# Patient Record
Sex: FEMALE | HISPANIC OR LATINO | URBAN - METROPOLITAN AREA
[De-identification: names, ages, dates, MRNs, and addresses within clinical notes are randomized per-mention and may not be internally consistent; named-entity substitution may affect disease eponyms.]

---

## 2017-01-05 ENCOUNTER — INPATIENT (INPATIENT)
Facility: HOSPITAL | Age: 37
LOS: 8 days | Discharge: HOME | DRG: 781 | End: 2017-01-14
Attending: OBSTETRICS & GYNECOLOGY | Admitting: OBSTETRICS & GYNECOLOGY
Payer: MEDICAID

## 2017-01-05 DIAGNOSIS — O47.02 FALSE LABOR BEFORE 37 COMPLETED WEEKS OF GESTATION, SECOND TRIMESTER: ICD-10-CM

## 2017-01-05 DIAGNOSIS — O34.219 MATERNAL CARE FOR UNSPECIFIED TYPE SCAR FROM PREVIOUS CESAREAN DELIVERY: ICD-10-CM

## 2017-01-05 DIAGNOSIS — O26.893 OTHER SPECIFIED PREGNANCY RELATED CONDITIONS, THIRD TRIMESTER: ICD-10-CM

## 2017-01-05 LAB
ALBUMIN SERPL ELPH-MCNC: 3.8 G/DL — SIGNIFICANT CHANGE UP (ref 3.3–5.2)
ALP SERPL-CCNC: 120 U/L — SIGNIFICANT CHANGE UP (ref 40–120)
ALT FLD-CCNC: 26 U/L — SIGNIFICANT CHANGE UP
AMPHET UR-MCNC: NEGATIVE — SIGNIFICANT CHANGE UP
ANION GAP SERPL CALC-SCNC: 21 MMOL/L — HIGH (ref 5–17)
APPEARANCE UR: CLEAR — SIGNIFICANT CHANGE UP
APTT BLD: 27.6 SEC — SIGNIFICANT CHANGE UP (ref 27.5–37.4)
AST SERPL-CCNC: 23 U/L — SIGNIFICANT CHANGE UP
BARBITURATES UR SCN-MCNC: NEGATIVE — SIGNIFICANT CHANGE UP
BASOPHILS # BLD AUTO: 0 K/UL — SIGNIFICANT CHANGE UP (ref 0–0.2)
BASOPHILS NFR BLD AUTO: 0.1 % — SIGNIFICANT CHANGE UP (ref 0–2)
BENZODIAZ UR-MCNC: NEGATIVE — SIGNIFICANT CHANGE UP
BILIRUB SERPL-MCNC: 0.4 MG/DL — SIGNIFICANT CHANGE UP (ref 0.4–2)
BILIRUB UR-MCNC: NEGATIVE — SIGNIFICANT CHANGE UP
BLD GP AB SCN SERPL QL: SIGNIFICANT CHANGE UP
BUN SERPL-MCNC: 10 MG/DL — SIGNIFICANT CHANGE UP (ref 8–20)
CALCIUM SERPL-MCNC: 9 MG/DL — SIGNIFICANT CHANGE UP (ref 8.6–10.2)
CHLORIDE SERPL-SCNC: 99 MMOL/L — SIGNIFICANT CHANGE UP (ref 98–107)
CO2 SERPL-SCNC: 18 MMOL/L — LOW (ref 22–29)
COCAINE METAB.OTHER UR-MCNC: NEGATIVE — SIGNIFICANT CHANGE UP
COLOR SPEC: YELLOW — SIGNIFICANT CHANGE UP
CREAT SERPL-MCNC: 0.48 MG/DL — LOW (ref 0.5–1.3)
DIFF PNL FLD: NEGATIVE — SIGNIFICANT CHANGE UP
EOSINOPHIL # BLD AUTO: 0 K/UL — SIGNIFICANT CHANGE UP (ref 0–0.5)
EOSINOPHIL NFR BLD AUTO: 0.2 % — SIGNIFICANT CHANGE UP (ref 0–6)
EPI CELLS # UR: SIGNIFICANT CHANGE UP
GLUCOSE SERPL-MCNC: 92 MG/DL — SIGNIFICANT CHANGE UP (ref 70–115)
GLUCOSE UR QL: NEGATIVE MG/DL — SIGNIFICANT CHANGE UP
HCOV OC43 RNA SPEC QL NAA+PROBE: DETECTED
HCT VFR BLD CALC: 35.6 % — LOW (ref 37–47)
HGB BLD-MCNC: 12 G/DL — SIGNIFICANT CHANGE UP (ref 12–16)
HIV 1 & 2 AB SERPL IA.RAPID: SIGNIFICANT CHANGE UP
INR BLD: 0.98 RATIO — SIGNIFICANT CHANGE UP (ref 0.88–1.16)
KETONES UR-MCNC: ABNORMAL
LEUKOCYTE ESTERASE UR-ACNC: ABNORMAL
LYMPHOCYTES # BLD AUTO: 1.4 K/UL — SIGNIFICANT CHANGE UP (ref 1–4.8)
LYMPHOCYTES # BLD AUTO: 11.8 % — LOW (ref 20–55)
MCHC RBC-ENTMCNC: 29.6 PG — SIGNIFICANT CHANGE UP (ref 27–31)
MCHC RBC-ENTMCNC: 33.7 G/DL — SIGNIFICANT CHANGE UP (ref 32–36)
MCV RBC AUTO: 87.9 FL — SIGNIFICANT CHANGE UP (ref 81–99)
METHADONE UR-MCNC: NEGATIVE — SIGNIFICANT CHANGE UP
MONOCYTES # BLD AUTO: 0.6 K/UL — SIGNIFICANT CHANGE UP (ref 0–0.8)
MONOCYTES NFR BLD AUTO: 5.5 % — SIGNIFICANT CHANGE UP (ref 3–10)
NEUTROPHILS # BLD AUTO: 9.8 K/UL — HIGH (ref 1.8–8)
NEUTROPHILS NFR BLD AUTO: 82.1 % — HIGH (ref 37–73)
NITRITE UR-MCNC: NEGATIVE — SIGNIFICANT CHANGE UP
OPIATES UR-MCNC: NEGATIVE — SIGNIFICANT CHANGE UP
PCP SPEC-MCNC: SIGNIFICANT CHANGE UP
PCP UR-MCNC: NEGATIVE — SIGNIFICANT CHANGE UP
PH UR: 6.5 — SIGNIFICANT CHANGE UP (ref 4.8–8)
PLATELET # BLD AUTO: 377 K/UL — SIGNIFICANT CHANGE UP (ref 150–400)
POTASSIUM SERPL-MCNC: 3.4 MMOL/L — LOW (ref 3.5–5.3)
POTASSIUM SERPL-SCNC: 3.4 MMOL/L — LOW (ref 3.5–5.3)
PROT SERPL-MCNC: 7.2 G/DL — SIGNIFICANT CHANGE UP (ref 6.6–8.7)
PROT UR-MCNC: NEGATIVE MG/DL — SIGNIFICANT CHANGE UP
PROTHROM AB SERPL-ACNC: 10.8 SEC — SIGNIFICANT CHANGE UP (ref 10–13.1)
RAPID RVP RESULT: DETECTED
RBC # BLD: 4.05 M/UL — LOW (ref 4.4–5.2)
RBC # FLD: 13.7 % — SIGNIFICANT CHANGE UP (ref 11–15.6)
SODIUM SERPL-SCNC: 138 MMOL/L — SIGNIFICANT CHANGE UP (ref 135–145)
SP GR SPEC: 1.01 — SIGNIFICANT CHANGE UP (ref 1.01–1.02)
THC UR QL: NEGATIVE — SIGNIFICANT CHANGE UP
TYPE + AB SCN PNL BLD: SIGNIFICANT CHANGE UP
UROBILINOGEN FLD QL: NEGATIVE MG/DL — SIGNIFICANT CHANGE UP
WBC # BLD: 11.87 K/UL — HIGH (ref 4.8–10.8)
WBC # FLD AUTO: 11.87 K/UL — HIGH (ref 4.8–10.8)
WBC UR QL: SIGNIFICANT CHANGE UP

## 2017-01-05 RX ORDER — SODIUM CHLORIDE 9 MG/ML
1000 INJECTION, SOLUTION INTRAVENOUS
Qty: 0 | Refills: 0 | Status: DISCONTINUED | OUTPATIENT
Start: 2017-01-05 | End: 2017-01-06

## 2017-01-05 RX ORDER — SODIUM CHLORIDE 9 MG/ML
1000 INJECTION, SOLUTION INTRAVENOUS ONCE
Qty: 0 | Refills: 0 | Status: COMPLETED | OUTPATIENT
Start: 2017-01-05 | End: 2017-01-05

## 2017-01-05 RX ADMIN — SODIUM CHLORIDE 1000 MILLILITER(S): 9 INJECTION, SOLUTION INTRAVENOUS at 20:05

## 2017-01-05 RX ADMIN — Medication 204 MILLIGRAM(S): at 20:59

## 2017-01-05 RX ADMIN — SODIUM CHLORIDE 125 MILLILITER(S): 9 INJECTION, SOLUTION INTRAVENOUS at 21:00

## 2017-01-06 VITALS — WEIGHT: 158.73 LBS | HEIGHT: 62 IN

## 2017-01-06 DIAGNOSIS — O09.522 SUPERVISION OF ELDERLY MULTIGRAVIDA, SECOND TRIMESTER: ICD-10-CM

## 2017-01-06 DIAGNOSIS — O99.89 OTHER SPECIFIED DISEASES AND CONDITIONS COMPLICATING PREGNANCY, CHILDBIRTH AND THE PUERPERIUM: ICD-10-CM

## 2017-01-06 DIAGNOSIS — Z3A.24 24 WEEKS GESTATION OF PREGNANCY: ICD-10-CM

## 2017-01-06 DIAGNOSIS — O30.049 TWIN PREGNANCY, DICHORIONIC/DIAMNIOTIC, UNSPECIFIED TRIMESTER: ICD-10-CM

## 2017-01-06 DIAGNOSIS — O21.1 HYPEREMESIS GRAVIDARUM WITH METABOLIC DISTURBANCE: ICD-10-CM

## 2017-01-06 DIAGNOSIS — B34.9 VIRAL INFECTION, UNSPECIFIED: ICD-10-CM

## 2017-01-06 LAB
ALBUMIN SERPL ELPH-MCNC: 3 G/DL — LOW (ref 3.3–5.2)
ALP SERPL-CCNC: 99 U/L — SIGNIFICANT CHANGE UP (ref 40–120)
ALT FLD-CCNC: 26 U/L — SIGNIFICANT CHANGE UP
ANION GAP SERPL CALC-SCNC: 17 MMOL/L — SIGNIFICANT CHANGE UP (ref 5–17)
APPEARANCE UR: CLEAR — SIGNIFICANT CHANGE UP
AST SERPL-CCNC: 23 U/L — SIGNIFICANT CHANGE UP
BACTERIA # UR AUTO: ABNORMAL
BASOPHILS # BLD AUTO: 0 K/UL — SIGNIFICANT CHANGE UP (ref 0–0.2)
BASOPHILS NFR BLD AUTO: 0.1 % — SIGNIFICANT CHANGE UP (ref 0–2)
BILIRUB SERPL-MCNC: 0.4 MG/DL — SIGNIFICANT CHANGE UP (ref 0.4–2)
BILIRUB UR-MCNC: NEGATIVE — SIGNIFICANT CHANGE UP
BUN SERPL-MCNC: 6 MG/DL — LOW (ref 8–20)
CALCIUM SERPL-MCNC: 8.9 MG/DL — SIGNIFICANT CHANGE UP (ref 8.6–10.2)
CHLORIDE SERPL-SCNC: 102 MMOL/L — SIGNIFICANT CHANGE UP (ref 98–107)
CO2 SERPL-SCNC: 16 MMOL/L — LOW (ref 22–29)
COLOR SPEC: YELLOW — SIGNIFICANT CHANGE UP
CREAT SERPL-MCNC: 0.4 MG/DL — LOW (ref 0.5–1.3)
DIFF PNL FLD: NEGATIVE — SIGNIFICANT CHANGE UP
EPI CELLS # UR: SIGNIFICANT CHANGE UP
GLUCOSE SERPL-MCNC: 101 MG/DL — SIGNIFICANT CHANGE UP (ref 70–115)
GLUCOSE UR QL: NEGATIVE MG/DL — SIGNIFICANT CHANGE UP
HBV SURFACE AG SERPL QL IA: SIGNIFICANT CHANGE UP
HCT VFR BLD CALC: 30.2 % — LOW (ref 37–47)
HGB BLD-MCNC: 10.4 G/DL — LOW (ref 12–16)
KETONES UR-MCNC: ABNORMAL
LACTATE BLDV-MCNC: 0.8 MMOL/L — SIGNIFICANT CHANGE UP (ref 0.7–2)
LEUKOCYTE ESTERASE UR-ACNC: ABNORMAL
LIDOCAIN IGE QN: 21 U/L — LOW (ref 22–51)
LYMPHOCYTES # BLD AUTO: 1.3 K/UL — SIGNIFICANT CHANGE UP (ref 1–4.8)
LYMPHOCYTES # BLD AUTO: 13.7 % — LOW (ref 20–55)
MCHC RBC-ENTMCNC: 29.5 PG — SIGNIFICANT CHANGE UP (ref 27–31)
MCHC RBC-ENTMCNC: 34.4 G/DL — SIGNIFICANT CHANGE UP (ref 32–36)
MCV RBC AUTO: 85.8 FL — SIGNIFICANT CHANGE UP (ref 81–99)
MONOCYTES # BLD AUTO: 0.7 K/UL — SIGNIFICANT CHANGE UP (ref 0–0.8)
MONOCYTES NFR BLD AUTO: 7.4 % — SIGNIFICANT CHANGE UP (ref 3–10)
NEUTROPHILS # BLD AUTO: 7.6 K/UL — SIGNIFICANT CHANGE UP (ref 1.8–8)
NEUTROPHILS NFR BLD AUTO: 78.3 % — HIGH (ref 37–73)
NITRITE UR-MCNC: NEGATIVE — SIGNIFICANT CHANGE UP
PH UR: 6 — SIGNIFICANT CHANGE UP (ref 4.8–8)
PLATELET # BLD AUTO: 350 K/UL — SIGNIFICANT CHANGE UP (ref 150–400)
POTASSIUM SERPL-MCNC: 3.8 MMOL/L — SIGNIFICANT CHANGE UP (ref 3.5–5.3)
POTASSIUM SERPL-SCNC: 3.8 MMOL/L — SIGNIFICANT CHANGE UP (ref 3.5–5.3)
PROCALCITONIN SERPL-MCNC: <0.23 NG/ML — SIGNIFICANT CHANGE UP (ref 0–0.5)
PROT SERPL-MCNC: 6.1 G/DL — LOW (ref 6.6–8.7)
PROT UR-MCNC: 15 MG/DL
RBC # BLD: 3.52 M/UL — LOW (ref 4.4–5.2)
RBC # FLD: 13.5 % — SIGNIFICANT CHANGE UP (ref 11–15.6)
RBC CASTS # UR COMP ASSIST: SIGNIFICANT CHANGE UP /HPF (ref 0–4)
RPR SERPL-ACNC: SIGNIFICANT CHANGE UP
RUBV IGG SER-ACNC: POSITIVE — SIGNIFICANT CHANGE UP
RUBV IGG SER-IMP: SIGNIFICANT CHANGE UP
SODIUM SERPL-SCNC: 135 MMOL/L — SIGNIFICANT CHANGE UP (ref 135–145)
SP GR SPEC: 1.02 — SIGNIFICANT CHANGE UP (ref 1.01–1.02)
T3 SERPL-MCNC: 108 NG/DL — SIGNIFICANT CHANGE UP (ref 80–200)
T4 AB SER-ACNC: 8.78 UG/DL — SIGNIFICANT CHANGE UP (ref 4.5–12)
TSH SERPL-MCNC: 2.46 UU/ML — SIGNIFICANT CHANGE UP (ref 0.27–4.2)
UROBILINOGEN FLD QL: 8 MG/DL
WBC # BLD: 9.75 K/UL — SIGNIFICANT CHANGE UP (ref 4.8–10.8)
WBC # FLD AUTO: 9.75 K/UL — SIGNIFICANT CHANGE UP (ref 4.8–10.8)
WBC UR QL: SIGNIFICANT CHANGE UP

## 2017-01-06 PROCEDURE — 76815 OB US LIMITED FETUS(S): CPT | Mod: 26

## 2017-01-06 PROCEDURE — 99253 IP/OBS CNSLTJ NEW/EST LOW 45: CPT | Mod: GC

## 2017-01-06 PROCEDURE — 76775 US EXAM ABDO BACK WALL LIM: CPT | Mod: 26

## 2017-01-06 PROCEDURE — 71010: CPT | Mod: 26

## 2017-01-06 RX ORDER — ONDANSETRON 8 MG/1
4 TABLET, FILM COATED ORAL EVERY 4 HOURS
Qty: 0 | Refills: 0 | Status: COMPLETED | OUTPATIENT
Start: 2017-01-06 | End: 2017-01-07

## 2017-01-06 RX ORDER — SODIUM CHLORIDE 9 MG/ML
1000 INJECTION, SOLUTION INTRAVENOUS ONCE
Qty: 0 | Refills: 0 | Status: COMPLETED | OUTPATIENT
Start: 2017-01-06 | End: 2017-01-06

## 2017-01-06 RX ORDER — PYRIDOXINE HCL (VITAMIN B6) 100 MG
25 TABLET ORAL EVERY 8 HOURS
Qty: 0 | Refills: 0 | Status: COMPLETED | OUTPATIENT
Start: 2017-01-06 | End: 2017-01-07

## 2017-01-06 RX ORDER — CEFAZOLIN SODIUM 1 G
2000 VIAL (EA) INJECTION ONCE
Qty: 0 | Refills: 0 | Status: COMPLETED | OUTPATIENT
Start: 2017-01-06 | End: 2017-01-06

## 2017-01-06 RX ORDER — THIAMINE MONONITRATE (VIT B1) 100 MG
100 TABLET ORAL DAILY
Qty: 0 | Refills: 0 | Status: DISCONTINUED | OUTPATIENT
Start: 2017-01-06 | End: 2017-01-06

## 2017-01-06 RX ORDER — SODIUM CHLORIDE 9 MG/ML
1000 INJECTION, SOLUTION INTRAVENOUS
Qty: 0 | Refills: 0 | Status: DISCONTINUED | OUTPATIENT
Start: 2017-01-06 | End: 2017-01-08

## 2017-01-06 RX ORDER — SODIUM CHLORIDE 9 MG/ML
1000 INJECTION, SOLUTION INTRAVENOUS
Qty: 0 | Refills: 0 | Status: DISCONTINUED | OUTPATIENT
Start: 2017-01-06 | End: 2017-01-06

## 2017-01-06 RX ORDER — SODIUM CHLORIDE 9 MG/ML
1000 INJECTION, SOLUTION INTRAVENOUS
Qty: 0 | Refills: 0 | Status: COMPLETED | OUTPATIENT
Start: 2017-01-08 | End: 2017-01-08

## 2017-01-06 RX ORDER — SODIUM CHLORIDE 9 MG/ML
1000 INJECTION, SOLUTION INTRAVENOUS
Qty: 0 | Refills: 0 | Status: COMPLETED | OUTPATIENT
Start: 2017-01-07 | End: 2017-01-07

## 2017-01-06 RX ORDER — METOCLOPRAMIDE HCL 10 MG
10 TABLET ORAL EVERY 8 HOURS
Qty: 0 | Refills: 0 | Status: DISCONTINUED | OUTPATIENT
Start: 2017-01-06 | End: 2017-01-08

## 2017-01-06 RX ORDER — SODIUM CHLORIDE 9 MG/ML
1000 INJECTION, SOLUTION INTRAVENOUS
Qty: 0 | Refills: 0 | Status: COMPLETED | OUTPATIENT
Start: 2017-01-06 | End: 2017-01-06

## 2017-01-06 RX ORDER — POTASSIUM CHLORIDE 20 MEQ
20 PACKET (EA) ORAL
Qty: 0 | Refills: 0 | Status: DISCONTINUED | OUTPATIENT
Start: 2017-01-06 | End: 2017-01-06

## 2017-01-06 RX ADMIN — ONDANSETRON 4 MILLIGRAM(S): 8 TABLET, FILM COATED ORAL at 18:21

## 2017-01-06 RX ADMIN — ONDANSETRON 4 MILLIGRAM(S): 8 TABLET, FILM COATED ORAL at 22:06

## 2017-01-06 RX ADMIN — SODIUM CHLORIDE 150 MILLILITER(S): 9 INJECTION, SOLUTION INTRAVENOUS at 20:07

## 2017-01-06 RX ADMIN — Medication 10 MILLIGRAM(S): at 18:45

## 2017-01-06 RX ADMIN — SODIUM CHLORIDE 125 MILLILITER(S): 9 INJECTION, SOLUTION INTRAVENOUS at 12:47

## 2017-01-06 RX ADMIN — ONDANSETRON 4 MILLIGRAM(S): 8 TABLET, FILM COATED ORAL at 14:12

## 2017-01-06 RX ADMIN — Medication 204 MILLIGRAM(S): at 06:50

## 2017-01-06 RX ADMIN — Medication 100 MILLIGRAM(S): at 01:30

## 2017-01-06 RX ADMIN — SODIUM CHLORIDE 1000 MILLILITER(S): 9 INJECTION, SOLUTION INTRAVENOUS at 08:53

## 2017-01-06 RX ADMIN — ONDANSETRON 4 MILLIGRAM(S): 8 TABLET, FILM COATED ORAL at 09:45

## 2017-01-06 RX ADMIN — Medication 25 MILLIGRAM(S): at 22:05

## 2017-01-06 RX ADMIN — SODIUM CHLORIDE 125 MILLILITER(S): 9 INJECTION, SOLUTION INTRAVENOUS at 10:25

## 2017-01-06 NOTE — CONSULT NOTE ADULT - ATTENDING COMMENTS
Patient examined with resident. Recommendations discussed with resident, OB provider and L & D RN staff.

## 2017-01-06 NOTE — CONSULT NOTE ADULT - PROBLEM SELECTOR RECOMMENDATION 4
Infectious disease consultation for Zika testing authorization Infectious disease consultation for Zika testing authorization.

## 2017-01-06 NOTE — CONSULT NOTE ADULT - ASSESSMENT
Patient is a 36 year old  Female, LMP 16, KESHAWN 17, EGA today is 24 weeks and 2 days, who presents with a chief complaint of nausea and vomiting for the past 3 days.  She has a twin gestation, and advanced maternal age.  Clinical presentation consistent with hyperemesis gravidarum, due to her ketonuria and hypokalemia.  She is suspected to have Zika virus exposure since she came from [place] where there is active Zika virus transmission and report having mosquito bites during pregnancy.  She tested positive for Coronovirus. Patient is a 36 year old  Female, LMP 16, KESHAWN 17, EGA today is 24 weeks and 2 days, who presents with a chief complaint of nausea and vomiting for the past 3 days.  She has a twin gestation, and advanced maternal age.  Clinical presentation consistent with hyperemesis gravidarum, due to her ketonuria and hypokalemia.  She is suspected to have Zika virus exposure since she came from [place] where there is active Zika virus transmission and she reported having mosquito bites during pregnancy.  She tested positive for Coronovirus.

## 2017-01-06 NOTE — CONSULT NOTE ADULT - PROBLEM SELECTOR RECOMMENDATION 9
Keep NPO.  Add Reglan 10mg IV q8h and continue with current meds otherwise.  Continue potassium supplementation until normal potassium level; will repeat Potassium level as needed.  Pyridoxine 25 mg q8h when tolerating oral feedings.  Repeat UA to determine whether ketonuria (starvation ketosis) has been corrected with IV D5 LR. Keep NPO.  Add Reglan 10mg IV q8h and continue with current antiemetic medications.  Continue potassium supplementation until normal potassium level; will repeat Potassium level as needed.  Pyridoxine 25 mg q8h when tolerating oral feedings.  Repeat UA to determine whether ketonuria (starvation ketosis) has been corrected with IV D5 LR.

## 2017-01-06 NOTE — CONSULT NOTE ADULT - SUBJECTIVE AND OBJECTIVE BOX
Patient is a 36 year old  Female, LMP 16, KESHAWN 17, EGA today is 24 weeks and 2 days, who presents with a chief complaint of nausea and vomiting for the past 3 days.  She states that she has been having nausea since [place].  [place].  She was hospitalized in [place] for approximately 15 days for severe nausea and vomiting.  She is known to have a twin pregnancy. On arrival to [facility], she was noted to have left costovertebral angle tenderness.   CBC revealed a WBC 11k.  UA revealed trace Leukocyte Esterase; negative nitrites; no WBCs.  Pt was given 1 dose of IV Ancef 2g for presumptive cystitis.  No urine culture sen.  Upon arrival at 11pm, she was given IV fluids, Bicitra x1, and IV Phenergan x1 for nausea and vomiting.  She was started on Zofran 4mg q4h early this morning.  Admission UA revealed large ketones and currently she is receiving maintenance D5 + LR IV fluids.  In addition she is receiving a daily multivitamin IV supplementation which includes thiamine. She is NPO.    OB Hx:  2 prior Full term spontaneous vaginal deliveries in her native country of [place].    Vital Signs Last 24 Hrs  T(C): --36.9  HR: --100  BP: --122/72  BP(mean): --91  RR: --16  SpO2: --100      PHYSICAL EXAM:    GENERAL: NAD,   EYES: EOMI, PERRLA, conjunctiva and sclera clear  ENMT: No tonsillar erythema, exudates, or enlargement; Moist mucous membranes, Good dentition, No lesions  NECK: Supple, No JVD, Normal thyroid  NERVOUS SYSTEM:  Alert & Oriented X3, Good concentration; Motor Strength 5/5 B/L upper and lower extremities; DTRs 2+ intact and symmetric  CHEST/LUNG: Clear to percussion bilaterally; No rales, rhonchi, wheezing, or rubs  HEART: Regular rate and rhythm; No murmurs, rubs, or gallops  ABDOMEN: Gravid, appropriate for gestational age, Nontender; Bowel sounds present  EXTREMITIES:  2+ Peripheral Pulses, No clubbing, cyanosis, or edema  LYMPH: No lymphadenopathy noted        LABS:                        12.0   11.87 )-----------( 377      ( 2017 21:50 )             35.6     CBC Full  -  ( 2017 21:50 )  WBC Count : 11.87 K/uL  Hemoglobin : 12.0 g/dL  Hematocrit : 35.6 %  Platelet Count - Automated : 377 K/uL  Mean Cell Volume : 87.9 fl  Mean Cell Hemoglobin : 29.6 pg  Mean Cell Hemoglobin Concentration : 33.7 g/dL  Auto Neutrophil # : 9.8 K/uL  Auto Lymphocyte # : 1.4 K/uL  Auto Monocyte # : 0.6 K/uL  Auto Eosinophil # : 0.0 K/uL  Auto Basophil # : 0.0 K/uL  Auto Neutrophil % : 82.1 %  Auto Lymphocyte % : 11.8 %  Auto Monocyte % : 5.5 %  Auto Eosinophil % : 0.2 %  Auto Basophil % : 0.1 %    2017 21:47    138    |  99     |  10.0   ----------------------------<  92     3.4     |  18.0   |  0.48     Ca    9.0        2017 21:47    TPro  7.2    /  Alb  3.8    /  TBili  0.4    /  DBili  x      /  AST  23     /  ALT  26     /  AlkPhos  120    2017 21:47    PT/INR - ( 2017 21:47 )   PT: 10.8 sec;   INR: 0.98 ratio     PTT - ( 2017 21:47 )  PTT:27.6 sec    Urinalysis Basic - ( 2017 21:44 )  Color: Yellow / Appearance: Clear / S.010 / pH: x  Gluc: x / Ketone: Large  / Bili: Negative / Urobili: Negative mg/dL   Blood: x / Protein: Negative mg/dL / Nitrite: Negative   Leuk Esterase: Trace / RBC: x / WBC 0-2   Sq Epi: x / Non Sq Epi: Occasional / Bacteria: x    Albumin, Serum: 3.8 g/dL ( @ 21:47)    LIVER FUNCTIONS - ( 2017 21:47 )  Alb: 3.8 g/dL / Pro: 7.2 g/dL / ALK PHOS: 120 U/L / ALT: 26 U/L / AST: 23 U/L            RADIOLOGY & ADDITIONAL TESTS:  US OB: The cervix measures 3.4 cm in length and appears closed.   Dichorionic, diamniotic twin intrauterine gestation is noted. Evaluation   of fetal anatomy is limited on this examination.     TWIN A (maternal left): Anterior placenta. Vertex presentation of the   fetus. The fetal heart rate of 153 beats per minute.   The measurements are as follows:   NOLBERTO: 17.3 cm (normal range of 9-23.8).   BPD: 5.9 cm (23 weeks 6 days).  HC:  21.8 cm (23 weeks 6 days).  AC: 19.0 cm (23 weeks 5 days).   FL: 4.1 cm (23 weeks 2 days).  Estimated fetal weight: 1 lb 5 oz (+/-3 oz) or 606.6 g (+/-91.0 g).  Estimated gestational age by US: 23 weeks 6 days (+/-1 weeks 5 days).  Expected date of delivery by US: 2017.     TWIN B (maternal right): Posterior placenta. Breech presentation of the   fetus. The fetal heart rate of 151 beats per minute.    The measurements are as follows:   NOLBERTO: 19.9 cm (normal range of 9-23.8).   BPD: 5.9 cm (24 weeks 2 days).  HC:  21.9 cm (24 weeks 0 day).  AC: 18.4 cm (23 weeks 2 days).   FL: 4.2 cm (23 weeks 4 days).  Estimated fetal weight: 1 lb 5 oz (+/-3 oz) or 596.2 g (+/-89.4 g).  Estimated gestational age by US: 23 weeks 6 days (+/-1 weeks 5 days).  Expected date of delivery by US: 17.     Impression:  Twin intrauterine gestation as described. Continued follow-up is   recommended.    US Renal: Right kidney: Measures 11.3 cm in length. No hydronephrosis or obvious   renal stone.   Left kidney: Measures 11.0 cm in length. No hydronephrosis or obvious   renal stone.   Bladder: Under distended, limiting evaluation Patient is a 36 year old  Female, LMP 16, KESHAWN 17, EGA today is 24 weeks and 2 days, who presents with a chief complaint of nausea and vomiting for the past 3 days.  She states that she has been having nausea since [place].  [place].  She was hospitalized in [place] for approximately 15 days for severe nausea and vomiting.  She is known to have a twin pregnancy. On arrival to [facility], she was noted to have left costovertebral angle tenderness.   CBC revealed a WBC 11k.  UA revealed trace Leukocyte Esterase; negative nitrites; no WBCs.  Pt was given 1 dose of IV Ancef 2g for presumptive cystitis.  No urine culture sen.  Upon arrival at 11pm, she was given IV fluids, Bicitra x1, and IV Phenergan x1 for nausea and vomiting.  She was started on Zofran 4mg q4h early this morning.  Admission UA revealed large ketones and currently she is receiving maintenance D5 + LR IV fluids.  In addition she is receiving a daily multivitamin IV supplementation which includes thiamine. She is NPO.  Patient noted to have a low potassium level.  She is currently receiving potassium supplementation.  She was noted to have a positive Rapid respiratory viral panel swab for Coronovirus.    OB Hx:  2 prior Full term spontaneous vaginal deliveries in her native country of [place].    Vital Signs Last 24 Hrs  T(C): --36.9  HR: --100  BP: --122/72  BP(mean): --91  RR: --16  SpO2: --100      PHYSICAL EXAM:    GENERAL: NAD,   EYES: EOMI, PERRLA, conjunctiva and sclera clear  ENMT: No tonsillar erythema, exudates, or enlargement; Moist mucous membranes, Good dentition, No lesions  NECK: Supple, No JVD, Normal thyroid  NERVOUS SYSTEM:  Alert & Oriented X3, Good concentration; Motor Strength 5/5 B/L upper and lower extremities; DTRs 2+ intact and symmetric  CHEST/LUNG: Clear to percussion bilaterally; No rales, rhonchi, wheezing, or rubs  HEART: Regular rate and rhythm; No murmurs, rubs, or gallops  ABDOMEN: Gravid, appropriate for gestational age, Nontender; Bowel sounds present  EXTREMITIES:  2+ Peripheral Pulses, No clubbing, cyanosis, or edema  LYMPH: No lymphadenopathy noted        LABS:                        12.0   11.87 )-----------( 377      ( 2017 21:50 )             35.6     CBC Full  -  ( 2017 21:50 )  WBC Count : 11.87 K/uL  Hemoglobin : 12.0 g/dL  Hematocrit : 35.6 %  Platelet Count - Automated : 377 K/uL  Mean Cell Volume : 87.9 fl  Mean Cell Hemoglobin : 29.6 pg  Mean Cell Hemoglobin Concentration : 33.7 g/dL  Auto Neutrophil # : 9.8 K/uL  Auto Lymphocyte # : 1.4 K/uL  Auto Monocyte # : 0.6 K/uL  Auto Eosinophil # : 0.0 K/uL  Auto Basophil # : 0.0 K/uL  Auto Neutrophil % : 82.1 %  Auto Lymphocyte % : 11.8 %  Auto Monocyte % : 5.5 %  Auto Eosinophil % : 0.2 %  Auto Basophil % : 0.1 %    2017 21:47    138    |  99     |  10.0   ----------------------------<  92     3.4     |  18.0   |  0.48     Ca    9.0        2017 21:47    TPro  7.2    /  Alb  3.8    /  TBili  0.4    /  DBili  x      /  AST  23     /  ALT  26     /  AlkPhos  120    2017 21:47    PT/INR - ( 2017 21:47 )   PT: 10.8 sec;   INR: 0.98 ratio     PTT - ( 2017 21:47 )  PTT:27.6 sec    Urinalysis Basic - ( 2017 21:44 )  Color: Yellow / Appearance: Clear / S.010 / pH: x  Gluc: x / Ketone: Large  / Bili: Negative / Urobili: Negative mg/dL   Blood: x / Protein: Negative mg/dL / Nitrite: Negative   Leuk Esterase: Trace / RBC: x / WBC 0-2   Sq Epi: x / Non Sq Epi: Occasional / Bacteria: x    Albumin, Serum: 3.8 g/dL ( @ 21:47)    LIVER FUNCTIONS - ( 2017 21:47 )  Alb: 3.8 g/dL / Pro: 7.2 g/dL / ALK PHOS: 120 U/L / ALT: 26 U/L / AST: 23 U/L            RADIOLOGY & ADDITIONAL TESTS:  US OB: The cervix measures 3.4 cm in length and appears closed.   Dichorionic, diamniotic twin intrauterine gestation is noted. Evaluation   of fetal anatomy is limited on this examination.     TWIN A (maternal left): Anterior placenta. Vertex presentation of the   fetus. The fetal heart rate of 153 beats per minute.   The measurements are as follows:   NOLBERTO: 17.3 cm (normal range of 9-23.8).   BPD: 5.9 cm (23 weeks 6 days).  HC:  21.8 cm (23 weeks 6 days).  AC: 19.0 cm (23 weeks 5 days).   FL: 4.1 cm (23 weeks 2 days).  Estimated fetal weight: 1 lb 5 oz (+/-3 oz) or 606.6 g (+/-91.0 g).  Estimated gestational age by US: 23 weeks 6 days (+/-1 weeks 5 days).  Expected date of delivery by US: 2017.     TWIN B (maternal right): Posterior placenta. Breech presentation of the   fetus. The fetal heart rate of 151 beats per minute.    The measurements are as follows:   NOLBERTO: 19.9 cm (normal range of 9-23.8).   BPD: 5.9 cm (24 weeks 2 days).  HC:  21.9 cm (24 weeks 0 day).  AC: 18.4 cm (23 weeks 2 days).   FL: 4.2 cm (23 weeks 4 days).  Estimated fetal weight: 1 lb 5 oz (+/-3 oz) or 596.2 g (+/-89.4 g).  Estimated gestational age by US: 23 weeks 6 days (+/-1 weeks 5 days).  Expected date of delivery by US: 17.     Impression:  Twin intrauterine gestation as described. Continued follow-up is   recommended.    US Renal: Right kidney: Measures 11.3 cm in length. No hydronephrosis or obvious   renal stone.   Left kidney: Measures 11.0 cm in length. No hydronephrosis or obvious   renal stone.   Bladder: Under distended, limiting evaluation Patient is a 36 year old  Female, LMP 16, KESHAWN 17, EGA today is 24 weeks and 2 days, who presents with a chief complaint of nausea and vomiting for the past 3 days.  She states that she has been having nausea since her [place].  [place] on [place].  She was hospitalized in [place] for approximately 15 days for severe nausea and vomiting.  She is known to have a twin pregnancy. On arrival to [facility], she was noted to have left costovertebral angle tenderness.   CBC revealed a WBC 11k.  UA revealed trace Leukocyte Esterase; negative nitrites; no WBCs.  Pt was given 1 dose of IV Ancef 2g for presumptive cystitis.  No urine culture sen.  She was given IV fluids, Bicitra x1, and IV Phenergan x1 for nausea and vomiting.  She was started on Zofran 4mg q4h early this morning.  Admission UA revealed large ketones and currently she is receiving maintenance D5 RL IV fluids.  In addition, she is receiving a daily multivitamin IV supplementation which includes thiamine. She is NPO.  Patient noted to have a low potassium level.  She is currently receiving potassium supplementation.  She was noted to have a positive Rapid respiratory viral panel swab for Coronovirus.    OB Hx:  2 prior Full term spontaneous vaginal deliveries in her native country of [place].    Vital Signs Last 24 Hrs  T(C): --36.9  HR: --100  BP: --122/72  BP(mean): --91  RR: --16  SpO2: --100    PHYSICAL EXAM:  GENERAL: NAD,   EYES: EOMI, PERRLA, conjunctiva and sclera clear  ENMT: No tonsillar erythema, exudates, or enlargement; Moist mucous membranes, Good dentition, No lesions  NECK: Supple, No JVD, Normal thyroid  NERVOUS SYSTEM:  Alert & Oriented X3, Good concentration; Motor Strength 5/5 B/L upper and lower extremities; DTRs 2+ intact and symmetric  CHEST/LUNG: Clear to percussion bilaterally; No rales, rhonchi, wheezing, or rubs  HEART: Regular rate and rhythm; No murmurs, rubs, or gallops  ABDOMEN: Gravid, appropriate for gestational age, Nontender; Bowel sounds present  EXTREMITIES:  2+ Peripheral Pulses, No clubbing, cyanosis, or edema  LYMPH: No lymphadenopathy noted    LABS:                        12.0   11.87 )-----------( 377      ( 2017 21:50 )             35.6     CBC Full  -  ( 2017 21:50 )  WBC Count : 11.87 K/uL  Hemoglobin : 12.0 g/dL  Hematocrit : 35.6 %  Platelet Count - Automated : 377 K/uL  Mean Cell Volume : 87.9 fl  Mean Cell Hemoglobin : 29.6 pg  Mean Cell Hemoglobin Concentration : 33.7 g/dL  Auto Neutrophil # : 9.8 K/uL  Auto Lymphocyte # : 1.4 K/uL  Auto Monocyte # : 0.6 K/uL  Auto Eosinophil # : 0.0 K/uL  Auto Basophil # : 0.0 K/uL  Auto Neutrophil % : 82.1 %  Auto Lymphocyte % : 11.8 %  Auto Monocyte % : 5.5 %  Auto Eosinophil % : 0.2 %  Auto Basophil % : 0.1 %    2017 21:47    138    |  99     |  10.0   ----------------------------<  92     3.4     |  18.0   |  0.48     Ca    9.0        2017 21:47    TPro  7.2    /  Alb  3.8    /  TBili  0.4    /  DBili  x      /  AST  23     /  ALT  26     /  AlkPhos  120    2017 21:47    PT/INR - ( 2017 21:47 )   PT: 10.8 sec;   INR: 0.98 ratio     PTT - ( 2017 21:47 )  PTT:27.6 sec    Urinalysis Basic - ( 2017 21:44 )  Color: Yellow / Appearance: Clear / S.010 / pH: x  Gluc: x / Ketone: Large  / Bili: Negative / Urobili: Negative mg/dL   Blood: x / Protein: Negative mg/dL / Nitrite: Negative   Leuk Esterase: Trace / RBC: x / WBC 0-2   Sq Epi: x / Non Sq Epi: Occasional / Bacteria: x    Albumin, Serum: 3.8 g/dL ( @ 21:47)    LIVER FUNCTIONS - ( 2017 21:47 )  Alb: 3.8 g/dL / Pro: 7.2 g/dL / ALK PHOS: 120 U/L / ALT: 26 U/L / AST: 23 U/L            RADIOLOGY & ADDITIONAL TESTS:  US OB: The cervix measures 3.4 cm in length and appears closed.   Dichorionic, diamniotic twin intrauterine gestation is noted. Evaluation   of fetal anatomy is limited on this examination.     TWIN A (maternal left): Anterior placenta. Vertex presentation of the   fetus. The fetal heart rate of 153 beats per minute.   The measurements are as follows:   NOLBERTO: 17.3 cm (normal range of 9-23.8).   BPD: 5.9 cm (23 weeks 6 days).  HC:  21.8 cm (23 weeks 6 days).  AC: 19.0 cm (23 weeks 5 days).   FL: 4.1 cm (23 weeks 2 days).  Estimated fetal weight: 1 lb 5 oz (+/-3 oz) or 606.6 g (+/-91.0 g).  Estimated gestational age by US: 23 weeks 6 days (+/-1 weeks 5 days).  Expected date of delivery by US: 2017.     TWIN B (maternal right): Posterior placenta. Breech presentation of the   fetus. The fetal heart rate of 151 beats per minute.    The measurements are as follows:   NOLBERTO: 19.9 cm (normal range of 9-23.8).   BPD: 5.9 cm (24 weeks 2 days).  HC:  21.9 cm (24 weeks 0 day).  AC: 18.4 cm (23 weeks 2 days).   FL: 4.2 cm (23 weeks 4 days).  Estimated fetal weight: 1 lb 5 oz (+/-3 oz) or 596.2 g (+/-89.4 g).  Estimated gestational age by US: 23 weeks 6 days (+/-1 weeks 5 days).  Expected date of delivery by US: 17.     Impression: Twin intrauterine gestation as described. Continued follow-up is   recommended.    US Renal: Right kidney: Measures 11.3 cm in length. No hydronephrosis or obvious   renal stone.   Left kidney: Measures 11.0 cm in length. No hydronephrosis or obvious   renal stone.   Bladder: Under distended, limiting evaluation

## 2017-01-07 LAB
ANION GAP SERPL CALC-SCNC: 15 MMOL/L — SIGNIFICANT CHANGE UP (ref 5–17)
APPEARANCE UR: ABNORMAL
APPEARANCE UR: CLEAR — SIGNIFICANT CHANGE UP
BACTERIA # UR AUTO: ABNORMAL
BILIRUB UR-MCNC: NEGATIVE — SIGNIFICANT CHANGE UP
BILIRUB UR-MCNC: NEGATIVE — SIGNIFICANT CHANGE UP
BUN SERPL-MCNC: 3 MG/DL — LOW (ref 8–20)
CALCIUM SERPL-MCNC: 8.6 MG/DL — SIGNIFICANT CHANGE UP (ref 8.6–10.2)
CHLORIDE SERPL-SCNC: 102 MMOL/L — SIGNIFICANT CHANGE UP (ref 98–107)
CO2 SERPL-SCNC: 21 MMOL/L — LOW (ref 22–29)
COLOR SPEC: YELLOW — SIGNIFICANT CHANGE UP
COLOR SPEC: YELLOW — SIGNIFICANT CHANGE UP
CREAT SERPL-MCNC: 0.46 MG/DL — LOW (ref 0.5–1.3)
DIFF PNL FLD: ABNORMAL
DIFF PNL FLD: ABNORMAL
EOSINOPHIL # BLD AUTO: 0 K/UL — SIGNIFICANT CHANGE UP (ref 0–0.5)
EOSINOPHIL NFR BLD AUTO: 0.1 % — SIGNIFICANT CHANGE UP (ref 0–6)
EPI CELLS # UR: ABNORMAL
EPI CELLS # UR: SIGNIFICANT CHANGE UP
GLUCOSE SERPL-MCNC: 139 MG/DL — HIGH (ref 70–115)
GLUCOSE UR QL: NEGATIVE MG/DL — SIGNIFICANT CHANGE UP
GLUCOSE UR QL: NEGATIVE MG/DL — SIGNIFICANT CHANGE UP
HAV IGM SER-ACNC: SIGNIFICANT CHANGE UP
HBV CORE IGM SER-ACNC: SIGNIFICANT CHANGE UP
HBV SURFACE AG SER-ACNC: SIGNIFICANT CHANGE UP
HCT VFR BLD CALC: 31 % — LOW (ref 37–47)
HCV AB S/CO SERPL IA: 0.12 S/CO — SIGNIFICANT CHANGE UP
HCV AB SERPL-IMP: SIGNIFICANT CHANGE UP
HGB BLD-MCNC: 10.3 G/DL — LOW (ref 12–16)
KETONES UR-MCNC: ABNORMAL
KETONES UR-MCNC: ABNORMAL
LEUKOCYTE ESTERASE UR-ACNC: ABNORMAL
LEUKOCYTE ESTERASE UR-ACNC: ABNORMAL
LYMPHOCYTES # BLD AUTO: 1.5 K/UL — SIGNIFICANT CHANGE UP (ref 1–4.8)
LYMPHOCYTES # BLD AUTO: 20.8 % — SIGNIFICANT CHANGE UP (ref 20–55)
MAGNESIUM SERPL-MCNC: 1.5 MG/DL — LOW (ref 1.8–2.5)
MCHC RBC-ENTMCNC: 28.8 PG — SIGNIFICANT CHANGE UP (ref 27–31)
MCHC RBC-ENTMCNC: 33.2 G/DL — SIGNIFICANT CHANGE UP (ref 32–36)
MCV RBC AUTO: 86.6 FL — SIGNIFICANT CHANGE UP (ref 81–99)
MEV IGM SER-ACNC: <20 AU/ML — SIGNIFICANT CHANGE UP (ref 0–19.9)
MONOCYTES # BLD AUTO: 0.6 K/UL — SIGNIFICANT CHANGE UP (ref 0–0.8)
MONOCYTES NFR BLD AUTO: 7.9 % — SIGNIFICANT CHANGE UP (ref 3–10)
NEUTROPHILS # BLD AUTO: 5.2 K/UL — SIGNIFICANT CHANGE UP (ref 1.8–8)
NEUTROPHILS NFR BLD AUTO: 70.8 % — SIGNIFICANT CHANGE UP (ref 37–73)
NITRITE UR-MCNC: NEGATIVE — SIGNIFICANT CHANGE UP
NITRITE UR-MCNC: NEGATIVE — SIGNIFICANT CHANGE UP
PH UR: 6 — SIGNIFICANT CHANGE UP (ref 4.8–8)
PH UR: 7 — SIGNIFICANT CHANGE UP (ref 4.8–8)
PLATELET # BLD AUTO: 327 K/UL — SIGNIFICANT CHANGE UP (ref 150–400)
POTASSIUM SERPL-MCNC: 3.2 MMOL/L — LOW (ref 3.5–5.3)
POTASSIUM SERPL-SCNC: 3.2 MMOL/L — LOW (ref 3.5–5.3)
PROT UR-MCNC: NEGATIVE MG/DL — SIGNIFICANT CHANGE UP
PROT UR-MCNC: NEGATIVE MG/DL — SIGNIFICANT CHANGE UP
RBC # BLD: 3.58 M/UL — LOW (ref 4.4–5.2)
RBC # FLD: 13.7 % — SIGNIFICANT CHANGE UP (ref 11–15.6)
RBC CASTS # UR COMP ASSIST: SIGNIFICANT CHANGE UP /HPF (ref 0–4)
RBC CASTS # UR COMP ASSIST: SIGNIFICANT CHANGE UP /HPF (ref 0–4)
RPR SERPL-ACNC: SIGNIFICANT CHANGE UP
SODIUM SERPL-SCNC: 138 MMOL/L — SIGNIFICANT CHANGE UP (ref 135–145)
SP GR SPEC: 1.01 — SIGNIFICANT CHANGE UP (ref 1.01–1.02)
SP GR SPEC: 1.01 — SIGNIFICANT CHANGE UP (ref 1.01–1.02)
UROBILINOGEN FLD QL: 4 MG/DL
UROBILINOGEN FLD QL: 4 MG/DL
WBC # BLD: 7.34 K/UL — SIGNIFICANT CHANGE UP (ref 4.8–10.8)
WBC # FLD AUTO: 7.34 K/UL — SIGNIFICANT CHANGE UP (ref 4.8–10.8)
WBC UR QL: ABNORMAL
WBC UR QL: SIGNIFICANT CHANGE UP

## 2017-01-07 PROCEDURE — 76705 ECHO EXAM OF ABDOMEN: CPT | Mod: 26

## 2017-01-07 RX ORDER — POTASSIUM CHLORIDE 20 MEQ
10 PACKET (EA) ORAL ONCE
Qty: 0 | Refills: 0 | Status: COMPLETED | OUTPATIENT
Start: 2017-01-07 | End: 2017-01-07

## 2017-01-07 RX ORDER — MAGNESIUM SULFATE 500 MG/ML
1 VIAL (ML) INJECTION ONCE
Qty: 0 | Refills: 0 | Status: COMPLETED | OUTPATIENT
Start: 2017-01-07 | End: 2017-01-07

## 2017-01-07 RX ORDER — ACETAMINOPHEN 500 MG
650 TABLET ORAL ONCE
Qty: 0 | Refills: 0 | Status: COMPLETED | OUTPATIENT
Start: 2017-01-07 | End: 2017-01-07

## 2017-01-07 RX ADMIN — Medication 10 MILLIGRAM(S): at 02:42

## 2017-01-07 RX ADMIN — Medication 100 GRAM(S): at 13:41

## 2017-01-07 RX ADMIN — ONDANSETRON 4 MILLIGRAM(S): 8 TABLET, FILM COATED ORAL at 06:01

## 2017-01-07 RX ADMIN — Medication 10 MILLIGRAM(S): at 10:00

## 2017-01-07 RX ADMIN — ONDANSETRON 4 MILLIGRAM(S): 8 TABLET, FILM COATED ORAL at 02:04

## 2017-01-07 RX ADMIN — Medication 10 MILLIGRAM(S): at 16:10

## 2017-01-07 RX ADMIN — SODIUM CHLORIDE 150 MILLILITER(S): 9 INJECTION, SOLUTION INTRAVENOUS at 04:01

## 2017-01-07 RX ADMIN — Medication 25 MILLIGRAM(S): at 06:02

## 2017-01-07 RX ADMIN — Medication 10 MILLIGRAM(S): at 22:05

## 2017-01-07 RX ADMIN — Medication 204 MILLIGRAM(S): at 22:51

## 2017-01-07 RX ADMIN — Medication 650 MILLIGRAM(S): at 22:07

## 2017-01-07 RX ADMIN — SODIUM CHLORIDE 150 MILLILITER(S): 9 INJECTION, SOLUTION INTRAVENOUS at 21:00

## 2017-01-07 RX ADMIN — Medication 100 MILLIEQUIVALENT(S): at 11:54

## 2017-01-07 RX ADMIN — SODIUM CHLORIDE 150 MILLILITER(S): 9 INJECTION, SOLUTION INTRAVENOUS at 09:20

## 2017-01-08 LAB
ANION GAP SERPL CALC-SCNC: 13 MMOL/L — SIGNIFICANT CHANGE UP (ref 5–17)
APPEARANCE UR: CLEAR — SIGNIFICANT CHANGE UP
BACTERIA # UR AUTO: ABNORMAL
BILIRUB UR-MCNC: NEGATIVE — SIGNIFICANT CHANGE UP
BUN SERPL-MCNC: <2 MG/DL — LOW (ref 8–20)
CALCIUM SERPL-MCNC: 8.5 MG/DL — LOW (ref 8.6–10.2)
CHLORIDE SERPL-SCNC: 103 MMOL/L — SIGNIFICANT CHANGE UP (ref 98–107)
CO2 SERPL-SCNC: 22 MMOL/L — SIGNIFICANT CHANGE UP (ref 22–29)
COLOR SPEC: YELLOW — SIGNIFICANT CHANGE UP
CREAT SERPL-MCNC: 0.39 MG/DL — LOW (ref 0.5–1.3)
CULTURE RESULTS: NO GROWTH — SIGNIFICANT CHANGE UP
CULTURE RESULTS: SIGNIFICANT CHANGE UP
DIFF PNL FLD: NEGATIVE — SIGNIFICANT CHANGE UP
EOSINOPHIL # BLD AUTO: 0 K/UL — SIGNIFICANT CHANGE UP (ref 0–0.5)
EOSINOPHIL NFR BLD AUTO: 0.1 % — SIGNIFICANT CHANGE UP (ref 0–6)
EPI CELLS # UR: ABNORMAL
GLUCOSE SERPL-MCNC: 131 MG/DL — HIGH (ref 70–115)
GLUCOSE UR QL: NEGATIVE MG/DL — SIGNIFICANT CHANGE UP
HCT VFR BLD CALC: 29.3 % — LOW (ref 37–47)
HGB BLD-MCNC: 10.2 G/DL — LOW (ref 12–16)
KETONES UR-MCNC: NEGATIVE — SIGNIFICANT CHANGE UP
LEUKOCYTE ESTERASE UR-ACNC: ABNORMAL
LYMPHOCYTES # BLD AUTO: 1.5 K/UL — SIGNIFICANT CHANGE UP (ref 1–4.8)
LYMPHOCYTES # BLD AUTO: 21.3 % — SIGNIFICANT CHANGE UP (ref 20–55)
MAGNESIUM SERPL-MCNC: 1.7 MG/DL — LOW (ref 1.8–2.5)
MCHC RBC-ENTMCNC: 29.9 PG — SIGNIFICANT CHANGE UP (ref 27–31)
MCHC RBC-ENTMCNC: 34.8 G/DL — SIGNIFICANT CHANGE UP (ref 32–36)
MCV RBC AUTO: 85.9 FL — SIGNIFICANT CHANGE UP (ref 81–99)
MONOCYTES # BLD AUTO: 0.6 K/UL — SIGNIFICANT CHANGE UP (ref 0–0.8)
MONOCYTES NFR BLD AUTO: 8.9 % — SIGNIFICANT CHANGE UP (ref 3–10)
NEUTROPHILS # BLD AUTO: 5 K/UL — SIGNIFICANT CHANGE UP (ref 1.8–8)
NEUTROPHILS NFR BLD AUTO: 69.3 % — SIGNIFICANT CHANGE UP (ref 37–73)
NITRITE UR-MCNC: NEGATIVE — SIGNIFICANT CHANGE UP
PH UR: 8 — SIGNIFICANT CHANGE UP (ref 4.8–8)
PLATELET # BLD AUTO: 327 K/UL — SIGNIFICANT CHANGE UP (ref 150–400)
POTASSIUM SERPL-MCNC: 3 MMOL/L — LOW (ref 3.5–5.3)
POTASSIUM SERPL-SCNC: 3 MMOL/L — LOW (ref 3.5–5.3)
PROT UR-MCNC: NEGATIVE MG/DL — SIGNIFICANT CHANGE UP
RBC # BLD: 3.41 M/UL — LOW (ref 4.4–5.2)
RBC # FLD: 13.6 % — SIGNIFICANT CHANGE UP (ref 11–15.6)
SODIUM SERPL-SCNC: 138 MMOL/L — SIGNIFICANT CHANGE UP (ref 135–145)
SP GR SPEC: 1.01 — SIGNIFICANT CHANGE UP (ref 1.01–1.02)
SPECIMEN SOURCE: SIGNIFICANT CHANGE UP
UROBILINOGEN FLD QL: 1 MG/DL
WBC # BLD: 7.19 K/UL — SIGNIFICANT CHANGE UP (ref 4.8–10.8)
WBC # FLD AUTO: 7.19 K/UL — SIGNIFICANT CHANGE UP (ref 4.8–10.8)
WBC UR QL: ABNORMAL

## 2017-01-08 PROCEDURE — 93010 ELECTROCARDIOGRAM REPORT: CPT

## 2017-01-08 RX ORDER — SODIUM CHLORIDE 9 MG/ML
1000 INJECTION, SOLUTION INTRAVENOUS
Qty: 0 | Refills: 0 | Status: DISCONTINUED | OUTPATIENT
Start: 2017-01-08 | End: 2017-01-11

## 2017-01-08 RX ORDER — SODIUM CHLORIDE 9 MG/ML
1000 INJECTION, SOLUTION INTRAVENOUS
Qty: 0 | Refills: 0 | Status: DISCONTINUED | OUTPATIENT
Start: 2017-01-08 | End: 2017-01-08

## 2017-01-08 RX ORDER — POTASSIUM CHLORIDE 20 MEQ
10 PACKET (EA) ORAL
Qty: 0 | Refills: 0 | Status: COMPLETED | OUTPATIENT
Start: 2017-01-08 | End: 2017-01-08

## 2017-01-08 RX ORDER — MAGNESIUM SULFATE 500 MG/ML
2 VIAL (ML) INJECTION ONCE
Qty: 0 | Refills: 0 | Status: DISCONTINUED | OUTPATIENT
Start: 2017-01-08 | End: 2017-01-08

## 2017-01-08 RX ORDER — ONDANSETRON 8 MG/1
4 TABLET, FILM COATED ORAL EVERY 6 HOURS
Qty: 0 | Refills: 0 | Status: DISCONTINUED | OUTPATIENT
Start: 2017-01-08 | End: 2017-01-09

## 2017-01-08 RX ORDER — MAGNESIUM SULFATE 500 MG/ML
2 VIAL (ML) INJECTION ONCE
Qty: 0 | Refills: 0 | Status: COMPLETED | OUTPATIENT
Start: 2017-01-08 | End: 2017-01-08

## 2017-01-08 RX ORDER — POTASSIUM CHLORIDE 20 MEQ
10 PACKET (EA) ORAL
Qty: 0 | Refills: 0 | Status: DISCONTINUED | OUTPATIENT
Start: 2017-01-08 | End: 2017-01-08

## 2017-01-08 RX ORDER — METOCLOPRAMIDE HCL 10 MG
10 TABLET ORAL EVERY 8 HOURS
Qty: 0 | Refills: 0 | Status: DISCONTINUED | OUTPATIENT
Start: 2017-01-08 | End: 2017-01-08

## 2017-01-08 RX ADMIN — Medication 100 MILLIEQUIVALENT(S): at 12:18

## 2017-01-08 RX ADMIN — SODIUM CHLORIDE 150 MILLILITER(S): 9 INJECTION, SOLUTION INTRAVENOUS at 23:40

## 2017-01-08 RX ADMIN — ONDANSETRON 4 MILLIGRAM(S): 8 TABLET, FILM COATED ORAL at 13:47

## 2017-01-08 RX ADMIN — ONDANSETRON 4 MILLIGRAM(S): 8 TABLET, FILM COATED ORAL at 20:15

## 2017-01-08 RX ADMIN — Medication 50 GRAM(S): at 08:57

## 2017-01-08 RX ADMIN — Medication 100 MILLIEQUIVALENT(S): at 10:10

## 2017-01-08 RX ADMIN — Medication 204 MILLIGRAM(S): at 06:50

## 2017-01-08 RX ADMIN — ONDANSETRON 4 MILLIGRAM(S): 8 TABLET, FILM COATED ORAL at 08:01

## 2017-01-08 RX ADMIN — Medication 204 MILLIGRAM(S): at 18:15

## 2017-01-08 RX ADMIN — Medication 100 MILLIEQUIVALENT(S): at 11:14

## 2017-01-08 RX ADMIN — SODIUM CHLORIDE 125 MILLILITER(S): 9 INJECTION, SOLUTION INTRAVENOUS at 15:27

## 2017-01-08 RX ADMIN — SODIUM CHLORIDE 150 MILLILITER(S): 9 INJECTION, SOLUTION INTRAVENOUS at 04:03
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
## 2017-01-08 NOTE — CHART NOTE - NSCHARTNOTEFT_GEN_A_CORE
Upon Nutritional Assessment by the Registered Dietitian your patient was determined to meet criteria / has evidence of the following diagnosis/diagnoses:          [ ]  Mild Protein Calorie Malnutrition        [ ]  Moderate Protein Calorie Malnutrition        [ X] Severe Protein Calorie Malnutrition        [ ] Unspecified Protein Calorie Malnutrition        [ ] Underweight / BMI <19        [ ] Morbid Obesity / BMI > 40      Findings as based on:  •  Comprehensive nutrition assessment and consultation  •  Calorie counts (nutrient intake analysis)  •  Food acceptance and intake status from observations by staff  •  Follow up  •  Patient education  •  Intervention secondary to interdisciplinary rounds  •   concerns      Treatment:    The following diet has been recommended:  Consider initiating TPN while pt unable to tolerate po diet.  If/as tolerance improves advance diet clear liquid -> full liquid -> regular  Prenatal vitamin and folate daily via tolerated route.      PROVIDER Section:     By signing this assessment you are acknowledging and agree with the diagnosis/diagnoses assigned by the Registered Dietitian    Comments:            X

## 2017-01-09 DIAGNOSIS — K59.00 CONSTIPATION, UNSPECIFIED: ICD-10-CM

## 2017-01-09 DIAGNOSIS — O09.32 SUPERVISION OF PREGNANCY WITH INSUFFICIENT ANTENATAL CARE, SECOND TRIMESTER: ICD-10-CM

## 2017-01-09 DIAGNOSIS — K21.9 GASTRO-ESOPHAGEAL REFLUX DISEASE WITHOUT ESOPHAGITIS: ICD-10-CM

## 2017-01-09 LAB
ALBUMIN SERPL ELPH-MCNC: 3 G/DL — LOW (ref 3.3–5.2)
ALP SERPL-CCNC: 98 U/L — SIGNIFICANT CHANGE UP (ref 40–120)
ALT FLD-CCNC: 81 U/L — HIGH
AMYLASE P1 CFR SERPL: 45 U/L — SIGNIFICANT CHANGE UP (ref 36–128)
ANION GAP SERPL CALC-SCNC: 11 MMOL/L — SIGNIFICANT CHANGE UP (ref 5–17)
ANION GAP SERPL CALC-SCNC: 12 MMOL/L — SIGNIFICANT CHANGE UP (ref 5–17)
AST SERPL-CCNC: 65 U/L — HIGH
BILIRUB SERPL-MCNC: 0.5 MG/DL — SIGNIFICANT CHANGE UP (ref 0.4–2)
BLD GP AB SCN SERPL QL: SIGNIFICANT CHANGE UP
BUN SERPL-MCNC: 2 MG/DL — LOW (ref 8–20)
BUN SERPL-MCNC: <2 MG/DL — LOW (ref 8–20)
CALCIUM SERPL-MCNC: 8.5 MG/DL — LOW (ref 8.6–10.2)
CALCIUM SERPL-MCNC: 8.6 MG/DL — SIGNIFICANT CHANGE UP (ref 8.6–10.2)
CHLORIDE SERPL-SCNC: 101 MMOL/L — SIGNIFICANT CHANGE UP (ref 98–107)
CHLORIDE SERPL-SCNC: 98 MMOL/L — SIGNIFICANT CHANGE UP (ref 98–107)
CO2 SERPL-SCNC: 22 MMOL/L — SIGNIFICANT CHANGE UP (ref 22–29)
CO2 SERPL-SCNC: 23 MMOL/L — SIGNIFICANT CHANGE UP (ref 22–29)
CREAT SERPL-MCNC: 0.36 MG/DL — LOW (ref 0.5–1.3)
CREAT SERPL-MCNC: 0.41 MG/DL — LOW (ref 0.5–1.3)
GLUCOSE SERPL-MCNC: 100 MG/DL — SIGNIFICANT CHANGE UP (ref 70–115)
GLUCOSE SERPL-MCNC: 111 MG/DL — SIGNIFICANT CHANGE UP (ref 70–115)
MAGNESIUM SERPL-MCNC: 1.7 MG/DL — LOW (ref 1.8–2.5)
PHOSPHATE SERPL-MCNC: 3.1 MG/DL — SIGNIFICANT CHANGE UP (ref 2.4–4.7)
POTASSIUM SERPL-MCNC: 3 MMOL/L — LOW (ref 3.5–5.3)
POTASSIUM SERPL-MCNC: 3.3 MMOL/L — LOW (ref 3.5–5.3)
POTASSIUM SERPL-MCNC: 3.6 MMOL/L — SIGNIFICANT CHANGE UP (ref 3.5–5.3)
POTASSIUM SERPL-SCNC: 3 MMOL/L — LOW (ref 3.5–5.3)
POTASSIUM SERPL-SCNC: 3.3 MMOL/L — LOW (ref 3.5–5.3)
POTASSIUM SERPL-SCNC: 3.6 MMOL/L — SIGNIFICANT CHANGE UP (ref 3.5–5.3)
PROT SERPL-MCNC: 6 G/DL — LOW (ref 6.6–8.7)
SODIUM SERPL-SCNC: 131 MMOL/L — LOW (ref 135–145)
SODIUM SERPL-SCNC: 136 MMOL/L — SIGNIFICANT CHANGE UP (ref 135–145)
TYPE + AB SCN PNL BLD: SIGNIFICANT CHANGE UP

## 2017-01-09 PROCEDURE — 99232 SBSQ HOSP IP/OBS MODERATE 35: CPT | Mod: GC

## 2017-01-09 RX ORDER — POLYETHYLENE GLYCOL 3350 17 G/17G
17 POWDER, FOR SOLUTION ORAL DAILY
Qty: 0 | Refills: 0 | Status: DISCONTINUED | OUTPATIENT
Start: 2017-01-09 | End: 2017-01-10

## 2017-01-09 RX ORDER — ENOXAPARIN SODIUM 100 MG/ML
40 INJECTION SUBCUTANEOUS DAILY
Qty: 0 | Refills: 0 | Status: DISCONTINUED | OUTPATIENT
Start: 2017-01-09 | End: 2017-01-14

## 2017-01-09 RX ORDER — ONDANSETRON 8 MG/1
4 TABLET, FILM COATED ORAL EVERY 6 HOURS
Qty: 0 | Refills: 0 | Status: DISCONTINUED | OUTPATIENT
Start: 2017-01-09 | End: 2017-01-12

## 2017-01-09 RX ORDER — ONDANSETRON 8 MG/1
4 TABLET, FILM COATED ORAL EVERY 6 HOURS
Qty: 0 | Refills: 0 | Status: DISCONTINUED | OUTPATIENT
Start: 2017-01-09 | End: 2017-01-09

## 2017-01-09 RX ORDER — MAGNESIUM SULFATE 500 MG/ML
1 VIAL (ML) INJECTION ONCE
Qty: 0 | Refills: 0 | Status: COMPLETED | OUTPATIENT
Start: 2017-01-09 | End: 2017-01-09

## 2017-01-09 RX ORDER — DOCUSATE SODIUM 100 MG
100 CAPSULE ORAL
Qty: 0 | Refills: 0 | Status: DISCONTINUED | OUTPATIENT
Start: 2017-01-09 | End: 2017-01-10

## 2017-01-09 RX ORDER — POLYETHYLENE GLYCOL 3350 17 G/17G
17 POWDER, FOR SOLUTION ORAL DAILY
Qty: 0 | Refills: 0 | Status: DISCONTINUED | OUTPATIENT
Start: 2017-01-09 | End: 2017-01-09

## 2017-01-09 RX ORDER — METOCLOPRAMIDE HCL 10 MG
10 TABLET ORAL EVERY 8 HOURS
Qty: 0 | Refills: 0 | Status: DISCONTINUED | OUTPATIENT
Start: 2017-01-09 | End: 2017-01-09

## 2017-01-09 RX ORDER — PANTOPRAZOLE SODIUM 20 MG/1
40 TABLET, DELAYED RELEASE ORAL EVERY 12 HOURS
Qty: 0 | Refills: 0 | Status: DISCONTINUED | OUTPATIENT
Start: 2017-01-09 | End: 2017-01-11

## 2017-01-09 RX ORDER — SODIUM CHLORIDE 9 MG/ML
1000 INJECTION, SOLUTION INTRAVENOUS
Qty: 0 | Refills: 0 | Status: DISCONTINUED | OUTPATIENT
Start: 2017-01-09 | End: 2017-01-09

## 2017-01-09 RX ORDER — SODIUM CHLORIDE 9 MG/ML
1000 INJECTION, SOLUTION INTRAVENOUS
Qty: 0 | Refills: 0 | Status: DISCONTINUED | OUTPATIENT
Start: 2017-01-09 | End: 2017-01-10

## 2017-01-09 RX ORDER — FAMOTIDINE 10 MG/ML
20 INJECTION INTRAVENOUS DAILY
Qty: 0 | Refills: 0 | Status: COMPLETED | OUTPATIENT
Start: 2017-01-09 | End: 2017-01-09

## 2017-01-09 RX ORDER — METOCLOPRAMIDE HCL 10 MG
10 TABLET ORAL EVERY 8 HOURS
Qty: 0 | Refills: 0 | Status: DISCONTINUED | OUTPATIENT
Start: 2017-01-09 | End: 2017-01-12

## 2017-01-09 RX ORDER — POTASSIUM CHLORIDE 20 MEQ
10 PACKET (EA) ORAL
Qty: 0 | Refills: 0 | Status: COMPLETED | OUTPATIENT
Start: 2017-01-09 | End: 2017-01-09

## 2017-01-09 RX ADMIN — Medication 100 MILLIGRAM(S): at 17:13

## 2017-01-09 RX ADMIN — Medication 10 MILLIGRAM(S): at 21:47

## 2017-01-09 RX ADMIN — POLYETHYLENE GLYCOL 3350 17 GRAM(S): 17 POWDER, FOR SOLUTION ORAL at 18:01

## 2017-01-09 RX ADMIN — SODIUM CHLORIDE 150 MILLILITER(S): 9 INJECTION, SOLUTION INTRAVENOUS at 07:19

## 2017-01-09 RX ADMIN — Medication 100 MILLIEQUIVALENT(S): at 10:00

## 2017-01-09 RX ADMIN — SODIUM CHLORIDE 150 MILLILITER(S): 9 INJECTION, SOLUTION INTRAVENOUS at 08:43

## 2017-01-09 RX ADMIN — FAMOTIDINE 20 MILLIGRAM(S): 10 INJECTION INTRAVENOUS at 12:01

## 2017-01-09 RX ADMIN — Medication 10 MILLIGRAM(S): at 14:46

## 2017-01-09 RX ADMIN — SODIUM CHLORIDE 150 MILLILITER(S): 9 INJECTION, SOLUTION INTRAVENOUS at 16:50

## 2017-01-09 RX ADMIN — ONDANSETRON 4 MILLIGRAM(S): 8 TABLET, FILM COATED ORAL at 08:07

## 2017-01-09 RX ADMIN — Medication 100 GRAM(S): at 09:00

## 2017-01-09 RX ADMIN — Medication 204 MILLIGRAM(S): at 06:03

## 2017-01-09 RX ADMIN — PANTOPRAZOLE SODIUM 40 MILLIGRAM(S): 20 TABLET, DELAYED RELEASE ORAL at 20:58

## 2017-01-09 RX ADMIN — Medication 100 MILLIEQUIVALENT(S): at 11:00

## 2017-01-09 RX ADMIN — ONDANSETRON 4 MILLIGRAM(S): 8 TABLET, FILM COATED ORAL at 02:03

## 2017-01-09 RX ADMIN — Medication 204 MILLIGRAM(S): at 00:11

## 2017-01-09 RX ADMIN — ENOXAPARIN SODIUM 40 MILLIGRAM(S): 100 INJECTION SUBCUTANEOUS at 12:11

## 2017-01-09 RX ADMIN — Medication 204 MILLIGRAM(S): at 17:19

## 2017-01-09 RX ADMIN — Medication 100 MILLIEQUIVALENT(S): at 12:10

## 2017-01-09 NOTE — CONSULT NOTE ADULT - PROBLEM SELECTOR RECOMMENDATION 9
Hyperemesis gravidum is usually 1st trimester. Her nausea and vomiting may be due to her viral illness. Check LF in am. Sludge on gallbladder    Supportive care.  Would resume ATC zofran. Phergan prn, reglan ATC  clear liquid diet

## 2017-01-09 NOTE — CONSULT NOTE ADULT - PROBLEM SELECTOR PROBLEM 2
Gastroesophageal reflux disease without esophagitis
Advanced maternal age in multigravida, second trimester

## 2017-01-09 NOTE — PROGRESS NOTE ADULT - PROBLEM SELECTOR PLAN 6
MFM OB ultrasound today. MFM OB ultrasound today.  DVT prophylaxis with daily SQ Lovenox till hospital discharge.

## 2017-01-09 NOTE — PROGRESS NOTE ADULT - PROBLEM SELECTOR PLAN 1
Keep NPO. Restart Reglan 10mg IV q8h and change phenergan from Q 6hours to Q 8hours. Change Zofran 4mg to prn.   Continue potassium supplementation until normal potassium level; will repeat Potassium level as needed. Continue Magnesium IV supplementation as needed. Pyridoxine 25 mg q8h when tolerating oral feedings.

## 2017-01-09 NOTE — PROGRESS NOTE ADULT - SUBJECTIVE AND OBJECTIVE BOX
Patient is a 36 year old  Female, LMP 16, KESHAWN 17, EGA today is 24 weeks and 5 days, who presented with nausea and vomiting. Now complains of heartburn. She was given a clear liquid diet over the weekend and continues to have severe nausea and vomiting. She is known to have a twin pregnancy. She has been given IV fluids. She was on Reglan IV which was discontinued yesterday.  She is currently on Zofran 4mg Q 6hours and Phenergan 25mg Q 6 hours.  Patient noted to have low potassium and magnesium levels today. Repeat UA  reveals resolved ketonuria (starvation ketosis).      OB Hx:  2 prior Full term spontaneous vaginal deliveries in her native country of [place].    Vital Signs Last 24 Hrs  T(C): --37.3  HR: --73  BP: --131/75  RR: --16  SpO2: --100    PHYSICAL EXAM:  GENERAL: NAD,   CHEST/LUNG: Clear to percussion bilaterally; No rales, rhonchi, wheezing, or rubs  HEART: Regular rate and rhythm; No murmurs, rubs, or gallops  ABDOMEN: Gravid, appropriate for gestational age, Nontender; Bowel sounds present  EXT: no calf tenderness bilaterally.    Fetal heart rate tracing reviewed- reveals moderate variation and 10 x10 accelerations in both fetuses.     LABS:                                 10.2   7.19  )-----------( 327      ( 2017 05:19 )             29.3     2017 05:42    131    |  98     |  2.0    ----------------------------<  100    3.0     |  22.0   |  0.36     Ca    8.5        2017 05:42  Phos  3.1       2017 05:42  Mg     1.7       2017 05:42    TPro  6.0    /  Alb  3.0    /  TBili  0.5    /  DBili  x      /  AST  65     /  ALT  81     /  AlkPhos  98     2017 05:42    Urinalysis Basic - ( 2017 08:41 )    Color: Yellow / Appearance: Clear / S.010 / pH: x  Gluc: x / Ketone: Negative  / Bili: Negative / Urobili: 1 mg/dL   Blood: x / Protein: Negative mg/dL / Nitrite: Negative   Leuk Esterase: Moderate / RBC: x / WBC 6-10   Sq Epi: x / Non Sq Epi: Moderate / Bacteria: Few    Urine culture on 2017: no growth Patient is a 36 year old  Female, LMP 16, KESHAWN 17, EGA today is 24 weeks and 5 days, who presented with nausea and vomiting. Now complains of heartburn. She was given a clear liquid diet over the weekend and continues to have severe nausea and vomiting. She is known to have a twin pregnancy. She has been given IV fluids. She was on Reglan IV which was discontinued yesterday.  She is currently on Zofran 4mg Q 6hours and Phenergan 25mg Q 6 hours.  Patient noted to have low potassium and magnesium levels today. Repeat UA  reveals resolved ketonuria (starvation ketosis). She has been getting daily DVT prophylaxis with lower extremity sequential compression devices.         OB Hx:  2 prior Full term spontaneous vaginal deliveries in her native country of [place].    Vital Signs Last 24 Hrs  T(C): --37.3  HR: --73  BP: --131/75  RR: --16  SpO2: --100    PHYSICAL EXAM:  GENERAL: NAD,   CHEST/LUNG: Clear to percussion bilaterally; No rales, rhonchi, wheezing, or rubs  HEART: Regular rate and rhythm; No murmurs, rubs, or gallops  ABDOMEN: Gravid, appropriate for gestational age, Nontender; Bowel sounds present  EXT: no calf tenderness bilaterally.    Fetal heart rate tracing reviewed- reveals moderate variation and 10 x10 accelerations in both fetuses.     LABS:                                 10.2   7.19  )-----------( 327      ( 2017 05:19 )             29.3     2017 05:42    131    |  98     |  2.0    ----------------------------<  100    3.0     |  22.0   |  0.36     Ca    8.5        2017 05:42  Phos  3.1       2017 05:42  Mg     1.7       2017 05:42    TPro  6.0    /  Alb  3.0    /  TBili  0.5    /  DBili  x      /  AST  65     /  ALT  81     /  AlkPhos  98     2017 05:42    Urinalysis Basic - ( 2017 08:41 )    Color: Yellow / Appearance: Clear / S.010 / pH: x  Gluc: x / Ketone: Negative  / Bili: Negative / Urobili: 1 mg/dL   Blood: x / Protein: Negative mg/dL / Nitrite: Negative   Leuk Esterase: Moderate / RBC: x / WBC 6-10   Sq Epi: x / Non Sq Epi: Moderate / Bacteria: Few    Urine culture on 2017: no growth

## 2017-01-09 NOTE — CONSULT NOTE ADULT - SUBJECTIVE AND OBJECTIVE BOX
Patient is a 36y old  Female who presents with a chief complaint of     HPI: Socialtyze  used. + nausea, vomiting, constipation. Patient is 36 . LMP . 24 weeks gestation.  Due date is . Pt is having twins.  Patient was admitted in September for 12 day in [place] for nausea and vomiting.   Since that point, patient has mild intermittent nasuea, mostly in am. Now with nausea and vomiting for 7 days, worst in the last 4 days. Has 6-7 episodes a day of bilious emesis.NO fevers. + constipation. + for Coronovirus. ID being consulted to R/O Zika virus.       Also complaining of heartburn and regurgitation since start of her pregnancy. Took no meds at home for this. + constipation. Last normal BM was 8 days ago.  Had small hard Bm today.  Patient  U/S showing sludge. AST 65, ALT 98, normal alk phos. Low k and magnesium.     GYN initially had patient on ATC zofran with reglan. Now on ATC phergan, zofran prn and reglan, and pepcid        REVIEW OF SYSTEMS:  Constitutional: No fever, weight loss or fatigue  ENMT:  No difficulty hearing, tinnitus, vertigo; No sinus or throat pain  Respiratory: No cough, wheezing, chills or hemoptysis  Cardiovascular: No chest pain, palpitations, dizziness or leg swelling  Gastrointestinal: + constipation, nausea, vomiting  Skin: No itching, burning, rashes or lesions   Musculoskeletal: No joint pain or swelling; No muscle, back or extremity pain    PAST MEDICAL & SURGICAL HISTORY:      FAMILY HISTORY:      SOCIAL HISTORY:  Smoking Status: [ ] Current, [ ] Former, [ ] Never  Pack Years:  [  ] EtOH  [  ] IVDA    MEDICATIONS:  MEDICATIONS  (STANDING):  dextrose 5% + lactated ringers. 1000milliLiter(s) IV Continuous <Continuous>  lactated ringers. 1000milliLiter(s) IV Continuous <Continuous>  promethazine IVPB 25milliGRAM(s) IV Intermittent every 8 hours  dextrose 5% + lactated ringers 1000milliLiter(s) IV Continuous <Continuous>  enoxaparin Injectable 40milliGRAM(s) SubCutaneous daily  metoclopramide Injectable 10milliGRAM(s) IV Push every 8 hours  docusate sodium 100milliGRAM(s) Oral two times a day    MEDICATIONS  (PRN):  ondansetron Injectable 4milliGRAM(s) IV Push every 6 hours PRN Nausea and/or Vomiting      Allergies    No Known Allergies    Intolerances        Vital Signs Last 24 Hrs  T(C): --  T(F): --  HR: --  BP: --  BP(mean): --  RR: --  SpO2: --  I & Os for 24h ending  @ 07:00  =============================================  IN: 6800 ml / OUT: 4051 ml / NET: 2749 ml    I & Os for current day (as of  @ 12:37)  =============================================  IN: 1517 ml / OUT: 250 ml / NET: 1267 ml        PHYSICAL EXAM:    General: Well developed; well nourished;   HEENT: MMM, conjunctiva and sclera clear  Gastrointestinal: gravid abdomenunds; No rebound or guarding  Extremities: Normal range of motion, No clubbing, cyanosis or edema  Neurological: Alert and oriented x3  Skin: Warm and dry. No obvious rash      LABS:                        10.2   7.19  )-----------( 327      ( 2017 05:19 )             29.3                 RADIOLOGY & ADDITIONAL STUDIES:

## 2017-01-09 NOTE — PROGRESS NOTE ADULT - ASSESSMENT
Patient is a 36 year old  Female, LMP 16, KESHAWN 17, EGA today is 24 weeks and 5 days, who has persistent severe nausea and vomiting during pregnancy.  She has a twin gestation, and advanced maternal age.    She is suspected to have Zika virus exposure since she came from [place] where there is active Zika virus transmission and she reported having mosquito bites during pregnancy.  She tested positive for Coronovirus.

## 2017-01-09 NOTE — CONSULT NOTE ADULT - PROBLEM SELECTOR RECOMMENDATION 3
would give miralax po if patient can tolerate. ( would give just after giving zofran)
[facility] OB ultrasound ASAP.

## 2017-01-10 LAB
ALBUMIN SERPL ELPH-MCNC: 3.1 G/DL — LOW (ref 3.3–5.2)
ALBUMIN SERPL ELPH-MCNC: 3.1 G/DL — LOW (ref 3.3–5.2)
ALP SERPL-CCNC: 105 U/L — SIGNIFICANT CHANGE UP (ref 40–120)
ALP SERPL-CCNC: 113 U/L — SIGNIFICANT CHANGE UP (ref 40–120)
ALT FLD-CCNC: 151 U/L — HIGH
ALT FLD-CCNC: 185 U/L — HIGH
AMYLASE P1 CFR SERPL: 48 U/L — SIGNIFICANT CHANGE UP (ref 36–128)
AMYLASE P1 CFR SERPL: 53 U/L — SIGNIFICANT CHANGE UP (ref 36–128)
ANION GAP SERPL CALC-SCNC: 15 MMOL/L — SIGNIFICANT CHANGE UP (ref 5–17)
ANION GAP SERPL CALC-SCNC: 16 MMOL/L — SIGNIFICANT CHANGE UP (ref 5–17)
AST SERPL-CCNC: 102 U/L — HIGH
AST SERPL-CCNC: 126 U/L — HIGH
BILIRUB DIRECT SERPL-MCNC: 0.3 MG/DL — SIGNIFICANT CHANGE UP (ref 0–0.3)
BILIRUB INDIRECT FLD-MCNC: 0.3 MG/DL — SIGNIFICANT CHANGE UP (ref 0.2–1)
BILIRUB SERPL-MCNC: 0.6 MG/DL — SIGNIFICANT CHANGE UP (ref 0.4–2)
BILIRUB SERPL-MCNC: 0.6 MG/DL — SIGNIFICANT CHANGE UP (ref 0.4–2)
BUN SERPL-MCNC: 2 MG/DL — LOW (ref 8–20)
BUN SERPL-MCNC: <2 MG/DL — LOW (ref 8–20)
CALCIUM SERPL-MCNC: 8.3 MG/DL — LOW (ref 8.6–10.2)
CALCIUM SERPL-MCNC: 8.6 MG/DL — SIGNIFICANT CHANGE UP (ref 8.6–10.2)
CHLORIDE SERPL-SCNC: 98 MMOL/L — SIGNIFICANT CHANGE UP (ref 98–107)
CHLORIDE SERPL-SCNC: 98 MMOL/L — SIGNIFICANT CHANGE UP (ref 98–107)
CO2 SERPL-SCNC: 21 MMOL/L — LOW (ref 22–29)
CO2 SERPL-SCNC: 22 MMOL/L — SIGNIFICANT CHANGE UP (ref 22–29)
CREAT SERPL-MCNC: 0.36 MG/DL — LOW (ref 0.5–1.3)
CREAT SERPL-MCNC: 0.44 MG/DL — LOW (ref 0.5–1.3)
EOSINOPHIL # BLD AUTO: 0 K/UL — SIGNIFICANT CHANGE UP (ref 0–0.5)
EOSINOPHIL NFR BLD AUTO: 0.5 % — SIGNIFICANT CHANGE UP (ref 0–6)
GLUCOSE SERPL-MCNC: 110 MG/DL — SIGNIFICANT CHANGE UP (ref 70–115)
GLUCOSE SERPL-MCNC: 111 MG/DL — SIGNIFICANT CHANGE UP (ref 70–115)
HCT VFR BLD CALC: 32.1 % — LOW (ref 37–47)
HGB BLD-MCNC: 10.9 G/DL — LOW (ref 12–16)
LIDOCAIN IGE QN: 21 U/L — LOW (ref 22–51)
LIDOCAIN IGE QN: 26 U/L — SIGNIFICANT CHANGE UP (ref 22–51)
LYMPHOCYTES # BLD AUTO: 1.7 K/UL — SIGNIFICANT CHANGE UP (ref 1–4.8)
LYMPHOCYTES # BLD AUTO: 26.7 % — SIGNIFICANT CHANGE UP (ref 20–55)
MCHC RBC-ENTMCNC: 29 PG — SIGNIFICANT CHANGE UP (ref 27–31)
MCHC RBC-ENTMCNC: 34 G/DL — SIGNIFICANT CHANGE UP (ref 32–36)
MCV RBC AUTO: 85.4 FL — SIGNIFICANT CHANGE UP (ref 81–99)
MONOCYTES # BLD AUTO: 0.5 K/UL — SIGNIFICANT CHANGE UP (ref 0–0.8)
MONOCYTES NFR BLD AUTO: 7.1 % — SIGNIFICANT CHANGE UP (ref 3–10)
NEUTROPHILS # BLD AUTO: 4.2 K/UL — SIGNIFICANT CHANGE UP (ref 1.8–8)
NEUTROPHILS NFR BLD AUTO: 65.4 % — SIGNIFICANT CHANGE UP (ref 37–73)
PLATELET # BLD AUTO: 336 K/UL — SIGNIFICANT CHANGE UP (ref 150–400)
POTASSIUM SERPL-MCNC: 3.2 MMOL/L — LOW (ref 3.5–5.3)
POTASSIUM SERPL-MCNC: 3.4 MMOL/L — LOW (ref 3.5–5.3)
POTASSIUM SERPL-SCNC: 3.2 MMOL/L — LOW (ref 3.5–5.3)
POTASSIUM SERPL-SCNC: 3.4 MMOL/L — LOW (ref 3.5–5.3)
PROT SERPL-MCNC: 5.8 G/DL — LOW (ref 6.6–8.7)
PROT SERPL-MCNC: 6.1 G/DL — LOW (ref 6.6–8.7)
RBC # BLD: 3.76 M/UL — LOW (ref 4.4–5.2)
RBC # FLD: 13.4 % — SIGNIFICANT CHANGE UP (ref 11–15.6)
SODIUM SERPL-SCNC: 135 MMOL/L — SIGNIFICANT CHANGE UP (ref 135–145)
SODIUM SERPL-SCNC: 135 MMOL/L — SIGNIFICANT CHANGE UP (ref 135–145)
VZV DNA, PCR RESULT: NEGATIVE — SIGNIFICANT CHANGE UP
WBC # BLD: 6.45 K/UL — SIGNIFICANT CHANGE UP (ref 4.8–10.8)
WBC # FLD AUTO: 6.45 K/UL — SIGNIFICANT CHANGE UP (ref 4.8–10.8)

## 2017-01-10 PROCEDURE — 99232 SBSQ HOSP IP/OBS MODERATE 35: CPT | Mod: GC

## 2017-01-10 PROCEDURE — 76705 ECHO EXAM OF ABDOMEN: CPT | Mod: 26

## 2017-01-10 RX ORDER — POTASSIUM CHLORIDE 20 MEQ
10 PACKET (EA) ORAL
Qty: 0 | Refills: 0 | Status: COMPLETED | OUTPATIENT
Start: 2017-01-10 | End: 2017-01-10

## 2017-01-10 RX ORDER — DEXTROSE MONOHYDRATE, SODIUM CHLORIDE, AND POTASSIUM CHLORIDE 50; .745; 4.5 G/1000ML; G/1000ML; G/1000ML
1000 INJECTION, SOLUTION INTRAVENOUS
Qty: 0 | Refills: 0 | Status: DISCONTINUED | OUTPATIENT
Start: 2017-01-10 | End: 2017-01-11

## 2017-01-10 RX ADMIN — SODIUM CHLORIDE 150 MILLILITER(S): 9 INJECTION, SOLUTION INTRAVENOUS at 08:55

## 2017-01-10 RX ADMIN — Medication 100 MILLIEQUIVALENT(S): at 09:03

## 2017-01-10 RX ADMIN — Medication 10 MILLIGRAM(S): at 14:49

## 2017-01-10 RX ADMIN — Medication 204 MILLIGRAM(S): at 10:31

## 2017-01-10 RX ADMIN — Medication 100 MILLIEQUIVALENT(S): at 10:25

## 2017-01-10 RX ADMIN — Medication 204 MILLIGRAM(S): at 18:15

## 2017-01-10 RX ADMIN — Medication 100 MILLIEQUIVALENT(S): at 12:13

## 2017-01-10 RX ADMIN — PANTOPRAZOLE SODIUM 40 MILLIGRAM(S): 20 TABLET, DELAYED RELEASE ORAL at 09:16

## 2017-01-10 RX ADMIN — Medication 10 MILLIGRAM(S): at 06:00

## 2017-01-10 RX ADMIN — PANTOPRAZOLE SODIUM 40 MILLIGRAM(S): 20 TABLET, DELAYED RELEASE ORAL at 21:09

## 2017-01-10 RX ADMIN — Medication 204 MILLIGRAM(S): at 01:57

## 2017-01-10 RX ADMIN — Medication 10 MILLIGRAM(S): at 22:47

## 2017-01-10 RX ADMIN — SODIUM CHLORIDE 150 MILLILITER(S): 9 INJECTION, SOLUTION INTRAVENOUS at 00:10

## 2017-01-10 RX ADMIN — ENOXAPARIN SODIUM 40 MILLIGRAM(S): 100 INJECTION SUBCUTANEOUS at 12:27

## 2017-01-10 NOTE — PROGRESS NOTE ADULT - SUBJECTIVE AND OBJECTIVE BOX
Patient is a 36 year old  Female, LMP 16, KESHAWN 17, EGA today is 24 weeks and 6 days, who presented with nausea and vomiting.  She is currently NPO and continues to have severe nausea and vomiting. She is known to have a twin pregnancy. She has been given IV fluids. She is being followed by gastroenterologists. She is currently receiving Reglan IV and Phenergan IV  Q 8hours. She is on Zofran 4mg Q 6hours prn.  Patient is receiving  potassium and magnesium replacements today. She is also getting a daily IV multivitamin. She is now getting SQ Lovenox daily for DVT prophylaxis.    OB Hx:  2 prior Full term spontaneous vaginal deliveries in her native country of [place].    Vital Signs Last 24 Hrs  T(C): --37.0  HR: --79  BP: --124/65  RR: --16  SpO2: --100    PHYSICAL EXAM:  GENERAL: NAD,   CHEST/LUNG: Clear to percussion bilaterally; No rales, rhonchi, wheezing, or rubs  HEART: Regular rate and rhythm; No murmurs, rubs, or gallops  ABDOMEN: Gravid, appropriate for gestational age, Nontender; Bowel sounds present  EXT: no calf tenderness bilaterally.    Fetal heart rate tracing reviewed- reveals moderate variation, and 10 x10 accelerations in both fetuses. There are no regular uterine contractions noted.     LABS:    2017 19:51    x      |  x      |  x      ----------------------------<  x      3.3     |  x      |  x        Ca    8.5        2017 05:42  Phos  3.1       2017 05:42  Mg     1.7       2017 05:42    TPro  6.0    /  Alb  3.0    /  TBili  0.5    /  DBili  x      /  AST  65     /  ALT  81     /  AlkPhos  98     2017 05:42    MFM OB Ultrasound on 16    Twin A- female fetus in breech presentation. MVP is 4.8 cm.  No major birth defects noted. EFW 1 lb 8 oz or 677gm. (42%).  Twin B- male fetus in transverse lie. MVP is 3.4 cm.  No major birth defects noted. EFW 1 lb 6oz or 625gm. (30%). Patient is a 36 year old  Female, LMP 16, KESHAWN 17, EGA today is 24 weeks and 6 days, who presented with nausea and vomiting.  She is currently NPO and continues to have severe nausea and vomiting. She is known to have a twin pregnancy. She has been given IV fluids. She is being followed by gastroenterologists. She is currently receiving Reglan IV and Phenergan IV  Q 8hours. She is on Zofran 4mg Q 6hours prn.  Patient is receiving  potassium and magnesium replacements today. She is also getting a daily IV multivitamin. She is now getting SQ Lovenox daily for DVT prophylaxis.    OB Hx:  2 prior Full term spontaneous vaginal deliveries in her native country of [place].    Vital Signs Last 24 Hrs  T(C): --37.0  HR: --79  BP: --124/65  RR: --16  SpO2: --100    PHYSICAL EXAM:  GENERAL: NAD,   CHEST/LUNG: Clear to percussion bilaterally; No rales, rhonchi, wheezing, or rubs  HEART: Regular rate and rhythm; No murmurs, rubs, or gallops  ABDOMEN: Gravid, appropriate for gestational age, Nontender; Bowel sounds present  EXT: no calf tenderness bilaterally.    Fetal heart rate tracing reviewed- reveals moderate variation, and 10 x10 accelerations in both fetuses. There are no regular uterine contractions noted.     LABS:    2017 19:51    x      |  x      |  x      ----------------------------<  x      3.3     |  x      |  x        Ca    8.5        2017 05:42  Phos  3.1       2017 05:42  Mg     1.7       2017 05:42    TPro  6.0    /  Alb  3.0    /  TBili  0.5    /  DBili  x      /  AST  65     /  ALT  81     /  AlkPhos  98     2017 05:42    MFM OB Ultrasound on 16  Twin A- female fetus in breech presentation. MVP is 4.8 cm.  No major birth defects noted. EFW 1 lb 8 oz or 677gm. (42%).  Twin B- male fetus in transverse lie. MVP is 3.4 cm.  No major birth defects noted. EFW 1 lb 6oz or 625gm. (30%).

## 2017-01-10 NOTE — PROGRESS NOTE ADULT - PROBLEM SELECTOR PLAN 1
Keep NPO. Continue Reglan 10mg IV q8h and phenergan from Q 8hours. Zofran 4mg Q 6hours prn.   Continue potassium supplementation until normal potassium level; will repeat Potassium level as needed. Continue Magnesium IV supplementation as needed.   Psychiatric consultation today. Keep NPO. Continue Reglan 10mg IV q8h and phenergan from Q 8hours. Zofran 4mg Q 6hours prn.   Continue potassium supplementation until normal potassium level; will repeat Potassium level as needed. Continue Magnesium IV supplementation as needed.   Psychiatric consultation today.  Daily weights.

## 2017-01-10 NOTE — PROGRESS NOTE ADULT - ASSESSMENT
Patient is a 36 year old  Female, LMP 16, KESHAWN 17, EGA today is 24 weeks and 6 days, who has persistent severe nausea and vomiting during pregnancy.  She has a twin gestation, and advanced maternal age.    She is suspected to have Zika virus exposure since she came from [place] where there is active Zika virus transmission and she reported having mosquito bites during pregnancy.  She tested positive for Coronovirus. Patient is a 36 year old  Female, LMP 16, KESHAWN 17, EGA today is 24 weeks and 6 days, who has persistent severe nausea and vomiting during pregnancy.  She has a twin gestation, and advanced maternal age. She is suspected to have Zika virus exposure since she came from [place] where there is active Zika virus transmission and she reported having mosquito bites during pregnancy.  She tested positive for Coronovirus.

## 2017-01-10 NOTE — PROGRESS NOTE ADULT - SUBJECTIVE AND OBJECTIVE BOX
Pt seen and examined Continued n/v but somewhat less frequemt  cc no bowel movement x 2 days.  epig pain.  Tried Miralax and immediate n/v.  On ivf and electrolyte replacement    REVIEW OF SYSTEMS:  Constitutional: No fever, weight loss or fatigue  Cardiovascular: No chest pain, palpitations, dizziness or leg swelling  Pulm:  No cough, sob, wheeze  Skin: No itching, burning, rashes or lesions   GI:  continued n/v and constip w abd pain  On Reglan Zofran promethazine, protonix      MEDICATIONS:  MEDICATIONS  (STANDING):  dextrose 5% + lactated ringers. 1000milliLiter(s) IV Continuous <Continuous>  lactated ringers. 1000milliLiter(s) IV Continuous <Continuous>  dextrose 5% + lactated ringers 1000milliLiter(s) IV Continuous <Continuous>  enoxaparin Injectable 40milliGRAM(s) SubCutaneous daily  docusate sodium 100milliGRAM(s) Oral two times a day  metoclopramide Injectable 10milliGRAM(s) IV Push every 8 hours  promethazine IVPB 25milliGRAM(s) IV Intermittent every 8 hours  polyethylene glycol 3350 17Gram(s) Oral daily  pantoprazole  Injectable 40milliGRAM(s) IV Push every 12 hours  potassium chloride  10 mEq/100 mL IVPB 10milliEquivalent(s) IV Intermittent every 1 hour    MEDICATIONS  (PRN):  ondansetron Injectable 4milliGRAM(s) IV Push every 6 hours PRN Nausea and/or Vomiting      Allergies    No Known Allergies    Intolerances        Vital Signs Last 24 Hrs  T(C): --  T(F): --  HR: --  BP: --  BP(mean): --  RR: --  SpO2: --    I & Os for current day (as of 01-10 @ 08:02)  =============================================  IN: 3450 ml / OUT: 1500 ml / NET: 1950 ml      PHYSICAL EXAM:    General: Well developed; well nourished; in no acute distress  HEENT: Normocephalic; Anicteric, eom intact, throat clear   HEART nsr; no mrg  LUNGS:  clear to ippa  Gastrointestinal: Abd soft; bs wnl.  No masses, organomegaly or tenderness  Skin: Warm and dry. No obvious rash  EXT neg without edema    LABS:        2017 19:51    x      |  x      |  x      ----------------------------<  x      3.3     |  x      |  x        Ca    8.5        2017 05:42  Phos  3.1       2017 05:42  Mg     1.7       2017 05:42    TPro  6.0    /  Alb  3.0    /  TBili  0.5    /  DBili  x      /  AST  65     /  ALT  81     /  AlkPhos  98     2017 05:42          Urinalysis Basic - ( 2017 08:41 )    Color: Yellow / Appearance: Clear / S.010 / pH: x  Gluc: x / Ketone: Negative  / Bili: Negative / Urobili: 1 mg/dL   Blood: x / Protein: Negative mg/dL / Nitrite: Negative   Leuk Esterase: Moderate / RBC: x / WBC 6-10   Sq Epi: x / Non Sq Epi: Moderate / Bacteria: Few      RADIOLOGY & ADDITIONAL STUDIES (The following images were personally reviewed):

## 2017-01-11 DIAGNOSIS — R94.5 ABNORMAL RESULTS OF LIVER FUNCTION STUDIES: ICD-10-CM

## 2017-01-11 DIAGNOSIS — Z3A.25 25 WEEKS GESTATION OF PREGNANCY: ICD-10-CM

## 2017-01-11 LAB
ALBUMIN SERPL ELPH-MCNC: 3.1 G/DL — LOW (ref 3.3–5.2)
ALP SERPL-CCNC: 112 U/L — SIGNIFICANT CHANGE UP (ref 40–120)
ALT FLD-CCNC: 210 U/L — HIGH
AMYLASE P1 CFR SERPL: 60 U/L — SIGNIFICANT CHANGE UP (ref 36–128)
ANION GAP SERPL CALC-SCNC: 15 MMOL/L — SIGNIFICANT CHANGE UP (ref 5–17)
AST SERPL-CCNC: 133 U/L — HIGH
BILIRUB SERPL-MCNC: 0.6 MG/DL — SIGNIFICANT CHANGE UP (ref 0.4–2)
BUN SERPL-MCNC: 2 MG/DL — LOW (ref 8–20)
CALCIUM SERPL-MCNC: 9 MG/DL — SIGNIFICANT CHANGE UP (ref 8.6–10.2)
CHLORIDE SERPL-SCNC: 99 MMOL/L — SIGNIFICANT CHANGE UP (ref 98–107)
CO2 SERPL-SCNC: 21 MMOL/L — LOW (ref 22–29)
CREAT SERPL-MCNC: 0.42 MG/DL — LOW (ref 0.5–1.3)
EOSINOPHIL # BLD AUTO: 0 K/UL — SIGNIFICANT CHANGE UP (ref 0–0.5)
EOSINOPHIL NFR BLD AUTO: 0.7 % — SIGNIFICANT CHANGE UP (ref 0–6)
GLUCOSE SERPL-MCNC: 118 MG/DL — HIGH (ref 70–115)
HCT VFR BLD CALC: 32.5 % — LOW (ref 37–47)
HGB BLD-MCNC: 10.9 G/DL — LOW (ref 12–16)
LIDOCAIN IGE QN: 38 U/L — SIGNIFICANT CHANGE UP (ref 22–51)
LYMPHOCYTES # BLD AUTO: 1.9 K/UL — SIGNIFICANT CHANGE UP (ref 1–4.8)
LYMPHOCYTES # BLD AUTO: 26.6 % — SIGNIFICANT CHANGE UP (ref 20–55)
MAGNESIUM SERPL-MCNC: 1.7 MG/DL — LOW (ref 1.8–2.5)
MCHC RBC-ENTMCNC: 28.9 PG — SIGNIFICANT CHANGE UP (ref 27–31)
MCHC RBC-ENTMCNC: 33.5 G/DL — SIGNIFICANT CHANGE UP (ref 32–36)
MCV RBC AUTO: 86.2 FL — SIGNIFICANT CHANGE UP (ref 81–99)
MONOCYTES # BLD AUTO: 0.6 K/UL — SIGNIFICANT CHANGE UP (ref 0–0.8)
MONOCYTES NFR BLD AUTO: 7.8 % — SIGNIFICANT CHANGE UP (ref 3–10)
NEUTROPHILS # BLD AUTO: 4.6 K/UL — SIGNIFICANT CHANGE UP (ref 1.8–8)
NEUTROPHILS NFR BLD AUTO: 64.5 % — SIGNIFICANT CHANGE UP (ref 37–73)
PLATELET # BLD AUTO: 338 K/UL — SIGNIFICANT CHANGE UP (ref 150–400)
POTASSIUM SERPL-MCNC: 3.5 MMOL/L — SIGNIFICANT CHANGE UP (ref 3.5–5.3)
POTASSIUM SERPL-SCNC: 3.5 MMOL/L — SIGNIFICANT CHANGE UP (ref 3.5–5.3)
PROT SERPL-MCNC: 6.2 G/DL — LOW (ref 6.6–8.7)
RBC # BLD: 3.77 M/UL — LOW (ref 4.4–5.2)
RBC # FLD: 13.6 % — SIGNIFICANT CHANGE UP (ref 11–15.6)
SODIUM SERPL-SCNC: 135 MMOL/L — SIGNIFICANT CHANGE UP (ref 135–145)
WBC # BLD: 7.18 K/UL — SIGNIFICANT CHANGE UP (ref 4.8–10.8)
WBC # FLD AUTO: 7.18 K/UL — SIGNIFICANT CHANGE UP (ref 4.8–10.8)

## 2017-01-11 PROCEDURE — 99232 SBSQ HOSP IP/OBS MODERATE 35: CPT | Mod: GC

## 2017-01-11 RX ORDER — DEXTROSE MONOHYDRATE, SODIUM CHLORIDE, AND POTASSIUM CHLORIDE 50; .745; 4.5 G/1000ML; G/1000ML; G/1000ML
1000 INJECTION, SOLUTION INTRAVENOUS
Qty: 0 | Refills: 0 | Status: DISCONTINUED | OUTPATIENT
Start: 2017-01-11 | End: 2017-01-13

## 2017-01-11 RX ORDER — SODIUM CHLORIDE 9 MG/ML
1000 INJECTION, SOLUTION INTRAVENOUS
Qty: 0 | Refills: 0 | Status: DISCONTINUED | OUTPATIENT
Start: 2017-01-11 | End: 2017-01-13

## 2017-01-11 RX ORDER — MAGNESIUM SULFATE 500 MG/ML
2 VIAL (ML) INJECTION ONCE
Qty: 0 | Refills: 0 | Status: COMPLETED | OUTPATIENT
Start: 2017-01-11 | End: 2017-01-11

## 2017-01-11 RX ORDER — POTASSIUM CHLORIDE 20 MEQ
10 PACKET (EA) ORAL
Qty: 0 | Refills: 0 | Status: COMPLETED | OUTPATIENT
Start: 2017-01-11 | End: 2017-01-11

## 2017-01-11 RX ORDER — FAMOTIDINE 10 MG/ML
20 INJECTION INTRAVENOUS EVERY 12 HOURS
Qty: 0 | Refills: 0 | Status: DISCONTINUED | OUTPATIENT
Start: 2017-01-11 | End: 2017-01-13

## 2017-01-11 RX ADMIN — SODIUM CHLORIDE 150 MILLILITER(S): 9 INJECTION, SOLUTION INTRAVENOUS at 09:06

## 2017-01-11 RX ADMIN — Medication 100 MILLIEQUIVALENT(S): at 16:26

## 2017-01-11 RX ADMIN — Medication 50 GRAM(S): at 14:56

## 2017-01-11 RX ADMIN — DEXTROSE MONOHYDRATE, SODIUM CHLORIDE, AND POTASSIUM CHLORIDE 150 MILLILITER(S): 50; .745; 4.5 INJECTION, SOLUTION INTRAVENOUS at 18:21

## 2017-01-11 RX ADMIN — Medication 204 MILLIGRAM(S): at 02:03

## 2017-01-11 RX ADMIN — PANTOPRAZOLE SODIUM 40 MILLIGRAM(S): 20 TABLET, DELAYED RELEASE ORAL at 09:07

## 2017-01-11 RX ADMIN — Medication 10 MILLIGRAM(S): at 06:35

## 2017-01-11 RX ADMIN — Medication 10 MILLIGRAM(S): at 23:22

## 2017-01-11 RX ADMIN — Medication 10 MILLIGRAM(S): at 13:49

## 2017-01-11 RX ADMIN — Medication 100 MILLIEQUIVALENT(S): at 12:10

## 2017-01-11 RX ADMIN — FAMOTIDINE 20 MILLIGRAM(S): 10 INJECTION INTRAVENOUS at 18:13

## 2017-01-11 RX ADMIN — Medication 100 MILLIEQUIVALENT(S): at 10:31

## 2017-01-11 RX ADMIN — DEXTROSE MONOHYDRATE, SODIUM CHLORIDE, AND POTASSIUM CHLORIDE 150 MILLILITER(S): 50; .745; 4.5 INJECTION, SOLUTION INTRAVENOUS at 02:16

## 2017-01-11 RX ADMIN — ENOXAPARIN SODIUM 40 MILLIGRAM(S): 100 INJECTION SUBCUTANEOUS at 12:14

## 2017-01-11 NOTE — PROGRESS NOTE ADULT - PROBLEM SELECTOR PLAN 1
Keep NPO. Continue Reglan 10mg IV q8h. Discontinue Phenergan due to suspected adverse drug reaction. Zofran 4mg Q 6hours prn.   Continue potassium supplementation until normal potassium level; will repeat Potassium level as needed. Continue Magnesium IV supplementation as needed.   Psychiatric consultation was done yesterday.   Continue daily weights.  Nutritionist consult to be done today.   Will discuss with GI the treatment option of NGT enteral feedings.

## 2017-01-11 NOTE — CHART NOTE - NSCHARTNOTEFT_GEN_A_CORE
Upon Nutritional Assessment by the Registered Dietitian your patient was determined to meet criteria / has evidence of the following diagnosis/diagnoses:          [ ]  Mild Protein Calorie Malnutrition        [ ]  Moderate Protein Calorie Malnutrition        [x ] Severe Protein Calorie Malnutrition        [ ] Unspecified Protein Calorie Malnutrition        [ ] Underweight / BMI <19        [ ] Morbid Obesity / BMI > 40      Findings as based on:  •  Comprehensive nutrition assessment and consultation  •  Calorie counts (nutrient intake analysis)  •  Food acceptance and intake status from observations by staff  •  Follow up  •  Patient education  •  Intervention secondary to interdisciplinary rounds  •   concerns      Treatment:    The following diet has been recommended:  Consider placing post pyloric feeding tube (tiger tube) when in place, consider initiating trophic feeds Elemental formula (VItal) @   10ml/hr, increase by 10ml every 12 hours as tolerated to goal rate of 50ml/hr     As/when PO feasible, start Clear liquid diet with Ensure Clear TID, As/when completely tolerated advance diet to Low Residue, [place] with Ensure Clear TID        PROVIDER Section:     By signing this assessment you are acknowledging and agree with the diagnosis/diagnoses assigned by the Registered Dietitian    Comments:

## 2017-01-11 NOTE — PROGRESS NOTE ADULT - PROBLEM SELECTOR PLAN 6
IV Protonix 40mg twice daily will be discontinued due to suspected drug reaction.   Will restart IV Pepcid 20mg Q twice daily.

## 2017-01-11 NOTE — PROGRESS NOTE ADULT - ASSESSMENT
Patient is a 36 year old  Female, LMP 16, KESHAWN 17, EGA today is 25 weeks and 0 days, who has persistent severe nausea and vomiting during pregnancy.  She has a twin gestation, and advanced maternal age. She is suspected to have Zika virus exposure since she came from [place] where there is active Zika virus transmission and she reported having mosquito bites during pregnancy.  She tested positive for Coronovirus.

## 2017-01-11 NOTE — PROGRESS NOTE ADULT - SUBJECTIVE AND OBJECTIVE BOX
Patient seen and examined.  Dr Browne as .  Last vomitin was overnight;  Moans. Currently NPO;  Hungry.  No fever.  Minimal abdominal pain.  Currently receiving only Reglan 10 ivp q 6 h.  Zofran and pepcid have been relegated to prn due to concerns about rising LFT's.  Moderate transaminitis noted. AST: 210 nd ALT" 112 range.  Hepatitis profiles negative.  Amylase and lipase are normal. Sono: No gallstones only sludge;  normal CBD.        PAST MEDICAL & SURGICAL HISTORY:      ROS:  No Heartburn, regurgitation, dysphagia, odynophagia.  No dyspepsia  No abdominal pain.    No Nausea, vomiting.  No Bleeding.  No hematemesis.   No diarrhea.    No hematochesia.  No weight loss, anorexia.  No edema.      MEDICATIONS  (STANDING):  enoxaparin Injectable 40milliGRAM(s) SubCutaneous daily  metoclopramide Injectable 10milliGRAM(s) IV Push every 8 hours  potassium chloride  10 mEq/100 mL IVPB 10milliEquivalent(s) IV Intermittent every 1 hour  dextrose 5% + lactated ringers with potassium chloride 20 mEq/L 1000milliLiter(s) IV Continuous <Continuous>  dextrose 5% + lactated ringers 1000milliLiter(s) IV Continuous <Continuous>  famotidine Injectable 20milliGRAM(s) IV Push every 12 hours  magnesium sulfate  IVPB 2Gram(s) IV Intermittent once    MEDICATIONS  (PRN):  ondansetron Injectable 4milliGRAM(s) IV Push every 6 hours PRN Nausea and/or Vomiting      Allergies    No Known Allergies      Vital Signs Last 24 Hrs  T(C): --  T(F): --  HR: --  BP: --  BP(mean): --  RR: --  SpO2: --    PHYSICAL EXAM:    GENERAL: NAD, well-groomed, well-developed  HEAD:  Atraumatic, Normocephalic  EYES: EOMI, PERRLA, conjunctiva and sclera clear  ENMT: No tonsillar erythema, exudates, or enlargement; Moist mucous membranes, Good dentition, No lesions  NECK: Supple, No JVD, Normal thyroid  CHEST/LUNG: Clear to percussion bilaterally; No rales, rhonchi, wheezing, or rubs  HEART: Regular rate and rhythm; No murmurs, rubs, or gallops  ABDOMEN: Soft, Nontender, Nondistended; Bowel sounds present  EXTREMITIES:  2+ Peripheral Pulses, No clubbing, cyanosis, or edema  LYMPH: No lymphadenopathy noted  SKIN: No rashes or lesions      LABS:                        10.9   7.18  )-----------( 338      ( 11 Jan 2017 08:14 )             32.5     11 Jan 2017 08:13    135    |  99     |  2.0    ----------------------------<  118    3.5     |  21.0   |  0.42     Ca    9.0        11 Jan 2017 08:13  Mg     1.7       11 Jan 2017 08:13    TPro  6.2    /  Alb  3.1    /  TBili  0.6    /  DBili  x      /  AST  133    /  ALT  210    /  AlkPhos  112    11 Jan 2017 08:13             RADIOLOGY & ADDITIONAL STUDIES:

## 2017-01-11 NOTE — PROGRESS NOTE ADULT - SUBJECTIVE AND OBJECTIVE BOX
Patient is a 36 year old  Female, LMP 16, KESHAWN 17, EGA today is 25 weeks and 0 days, who presented with severe nausea and vomiting.  She is currently NPO and continues to have severe nausea and vomiting. She is known to have a twin pregnancy. She is being followed by gastroenterologists. She is currently receiving Reglan IV and Phenergan IV  Q 8hours. She is on Zofran 4mg Q 6hours prn.  Patient is receiving  potassium and magnesium replacements today. She is also getting a daily IV multivitamin. She is getting SQ Lovenox daily for DVT prophylaxis. Liver function test were noted to be elevated yesterday likely secondary to Protonix Rx  and Phenergan Rx  ( drug induced).  She says she feels better today with more energy.  She had one episode of vomiting last night. She had 1 bowel movement last night.  Psychiatric evaluation was done yesterday.  She was found to have adjustment disorder with depressive mood. No medication was recommended.  They believed that her nausea and vomiting was not due to a mental disorder. Her weight on 16 was 159lbs and her weight yesterday was 153lbs.    OB Hx:  2 prior Full term spontaneous vaginal deliveries in her native country of [place].    Vital Signs Last 24 Hrs  T(C): --37.0  HR: --75  10 2017 23:15    135    |  98     |  <2.0   ----------------------------<  111    3.2     |  21.0   |  0.36     Ca    8.6        10 Kris 2017 23:15    TPro  6.1    /  Alb  3.1    /  TBili  0.6    /  DBili  x      /  AST  126    /  ALT  185    /  AlkPhos  113    10 Kris 2017 23:15  BP: --135/77  RR: --20  SpO2: --100    PHYSICAL EXAM:  GENERAL: NAD,   CHEST/LUNG: Clear to percussion bilaterally; No rales, rhonchi, wheezing, or rubs  HEART: Regular rate and rhythm; No murmurs, rubs, or gallops  ABDOMEN: Gravid, appropriate for gestational age, Nontender; Bowel sounds present  EXT: no calf tenderness bilaterally.    Fetal heart rate tracing from 2:30 am to 3:00 am reviewed- reveals moderate variation, and 10 x10 accelerations in both fetuses. There are no regular uterine contractions noted.     LABS:                        10.9   6.45  )-----------( 336      ( 10 Kris 2017 23:15 )             32.1       10 Kris 2017 23:15    135    |  98     |  <2.0   ----------------------------<  111    3.2     |  21.0   |  0.36     Ca    8.6        10 Kris 2017 23:15    TPro  6.1    /  Alb  3.1    /  TBili  0.6    /  DBili  x      /  AST  126    /  ALT  185    /  AlkPhos  113    10 Kris 2017 23:15    MFM OB Ultrasound on 16  Twin A- female fetus in breech presentation. MVP is 4.8 cm.  No major birth defects noted. EFW 1 lb 8 oz or 677gm. (42%).  Twin B- male fetus in transverse lie. MVP is 3.4 cm.  No major birth defects noted. EFW 1 lb 6oz or 625gm. (30%).

## 2017-01-12 LAB
ALBUMIN SERPL ELPH-MCNC: 3.3 G/DL — SIGNIFICANT CHANGE UP (ref 3.3–5.2)
ALP SERPL-CCNC: 116 U/L — SIGNIFICANT CHANGE UP (ref 40–120)
ALP SERPL-CCNC: 117 U/L — SIGNIFICANT CHANGE UP (ref 40–120)
ALP SERPL-CCNC: 117 U/L — SIGNIFICANT CHANGE UP (ref 40–120)
ALT FLD-CCNC: 274 U/L — HIGH
ALT FLD-CCNC: 289 U/L — HIGH
ALT FLD-CCNC: 289 U/L — HIGH
AMYLASE P1 CFR SERPL: 77 U/L — SIGNIFICANT CHANGE UP (ref 36–128)
ANION GAP SERPL CALC-SCNC: 13 MMOL/L — SIGNIFICANT CHANGE UP (ref 5–17)
ANION GAP SERPL CALC-SCNC: 17 MMOL/L — SIGNIFICANT CHANGE UP (ref 5–17)
AST SERPL-CCNC: 166 U/L — HIGH
AST SERPL-CCNC: 166 U/L — HIGH
AST SERPL-CCNC: 171 U/L — HIGH
BILIRUB DIRECT SERPL-MCNC: 0.3 MG/DL — SIGNIFICANT CHANGE UP (ref 0–0.3)
BILIRUB INDIRECT FLD-MCNC: 0.4 MG/DL — SIGNIFICANT CHANGE UP (ref 0.2–1)
BILIRUB SERPL-MCNC: 0.6 MG/DL — SIGNIFICANT CHANGE UP (ref 0.4–2)
BILIRUB SERPL-MCNC: 0.7 MG/DL — SIGNIFICANT CHANGE UP (ref 0.4–2)
BILIRUB SERPL-MCNC: 0.7 MG/DL — SIGNIFICANT CHANGE UP (ref 0.4–2)
BUN SERPL-MCNC: 2 MG/DL — LOW (ref 8–20)
BUN SERPL-MCNC: 2 MG/DL — LOW (ref 8–20)
CALCIUM SERPL-MCNC: 8.7 MG/DL — SIGNIFICANT CHANGE UP (ref 8.6–10.2)
CALCIUM SERPL-MCNC: 9.1 MG/DL — SIGNIFICANT CHANGE UP (ref 8.6–10.2)
CHLORIDE SERPL-SCNC: 96 MMOL/L — LOW (ref 98–107)
CHLORIDE SERPL-SCNC: 99 MMOL/L — SIGNIFICANT CHANGE UP (ref 98–107)
CO2 SERPL-SCNC: 21 MMOL/L — LOW (ref 22–29)
CO2 SERPL-SCNC: 23 MMOL/L — SIGNIFICANT CHANGE UP (ref 22–29)
CREAT SERPL-MCNC: 0.38 MG/DL — LOW (ref 0.5–1.3)
CREAT SERPL-MCNC: 0.49 MG/DL — LOW (ref 0.5–1.3)
GLUCOSE SERPL-MCNC: 104 MG/DL — SIGNIFICANT CHANGE UP (ref 70–115)
GLUCOSE SERPL-MCNC: 98 MG/DL — SIGNIFICANT CHANGE UP (ref 70–115)
LIDOCAIN IGE QN: 57 U/L — HIGH (ref 22–51)
MAGNESIUM SERPL-MCNC: 1.9 MG/DL — SIGNIFICANT CHANGE UP (ref 1.8–2.5)
POTASSIUM SERPL-MCNC: 3.8 MMOL/L — SIGNIFICANT CHANGE UP (ref 3.5–5.3)
POTASSIUM SERPL-MCNC: 4 MMOL/L — SIGNIFICANT CHANGE UP (ref 3.5–5.3)
POTASSIUM SERPL-SCNC: 3.8 MMOL/L — SIGNIFICANT CHANGE UP (ref 3.5–5.3)
POTASSIUM SERPL-SCNC: 4 MMOL/L — SIGNIFICANT CHANGE UP (ref 3.5–5.3)
PROT SERPL-MCNC: 6.4 G/DL — LOW (ref 6.6–8.7)
PROT SERPL-MCNC: 6.5 G/DL — LOW (ref 6.6–8.7)
PROT SERPL-MCNC: 6.5 G/DL — LOW (ref 6.6–8.7)
SODIUM SERPL-SCNC: 132 MMOL/L — LOW (ref 135–145)
SODIUM SERPL-SCNC: 137 MMOL/L — SIGNIFICANT CHANGE UP (ref 135–145)
T3FREE SERPL-MCNC: 2.17 PG/ML — SIGNIFICANT CHANGE UP (ref 1.8–4.6)
T4 FREE SERPL-MCNC: 1.3 NG/DL — SIGNIFICANT CHANGE UP (ref 0.9–1.8)
TSH SERPL-MCNC: 2.17 UIU/ML — SIGNIFICANT CHANGE UP (ref 0.27–4.2)

## 2017-01-12 PROCEDURE — 99233 SBSQ HOSP IP/OBS HIGH 50: CPT

## 2017-01-12 RX ORDER — METOCLOPRAMIDE HCL 10 MG
10 TABLET ORAL
Qty: 0 | Refills: 0 | Status: DISCONTINUED | OUTPATIENT
Start: 2017-01-12 | End: 2017-01-14

## 2017-01-12 RX ORDER — ONDANSETRON 8 MG/1
6 TABLET, FILM COATED ORAL EVERY 6 HOURS
Qty: 0 | Refills: 0 | Status: DISCONTINUED | OUTPATIENT
Start: 2017-01-12 | End: 2017-01-13

## 2017-01-12 RX ORDER — MAGNESIUM HYDROXIDE 400 MG/1
30 TABLET, CHEWABLE ORAL DAILY
Qty: 0 | Refills: 0 | Status: DISCONTINUED | OUTPATIENT
Start: 2017-01-12 | End: 2017-01-14

## 2017-01-12 RX ADMIN — MAGNESIUM HYDROXIDE 30 MILLILITER(S): 400 TABLET, CHEWABLE ORAL at 10:02

## 2017-01-12 RX ADMIN — Medication 10 MILLIGRAM(S): at 16:20

## 2017-01-12 RX ADMIN — ONDANSETRON 6 MILLIGRAM(S): 8 TABLET, FILM COATED ORAL at 18:35

## 2017-01-12 RX ADMIN — Medication 10 MILLIGRAM(S): at 12:07

## 2017-01-12 RX ADMIN — SODIUM CHLORIDE 150 MILLILITER(S): 9 INJECTION, SOLUTION INTRAVENOUS at 01:45

## 2017-01-12 RX ADMIN — Medication 10 MILLIGRAM(S): at 22:48

## 2017-01-12 RX ADMIN — FAMOTIDINE 20 MILLIGRAM(S): 10 INJECTION INTRAVENOUS at 06:06

## 2017-01-12 RX ADMIN — ONDANSETRON 6 MILLIGRAM(S): 8 TABLET, FILM COATED ORAL at 12:09

## 2017-01-12 RX ADMIN — DEXTROSE MONOHYDRATE, SODIUM CHLORIDE, AND POTASSIUM CHLORIDE 150 MILLILITER(S): 50; .745; 4.5 INJECTION, SOLUTION INTRAVENOUS at 17:59

## 2017-01-12 RX ADMIN — ENOXAPARIN SODIUM 40 MILLIGRAM(S): 100 INJECTION SUBCUTANEOUS at 12:29

## 2017-01-12 RX ADMIN — FAMOTIDINE 20 MILLIGRAM(S): 10 INJECTION INTRAVENOUS at 18:35

## 2017-01-12 RX ADMIN — Medication 10 MILLIGRAM(S): at 07:44

## 2017-01-12 RX ADMIN — Medication 10 MILLIGRAM(S): at 11:54

## 2017-01-12 RX ADMIN — DEXTROSE MONOHYDRATE, SODIUM CHLORIDE, AND POTASSIUM CHLORIDE 150 MILLILITER(S): 50; .745; 4.5 INJECTION, SOLUTION INTRAVENOUS at 10:15

## 2017-01-12 NOTE — PROGRESS NOTE ADULT - PROBLEM SELECTOR PLAN 1
continue with anti emetic medications and trial of diet if lipase is trending down.   Potassium level as needed. Continue Magnesium IV supplementation as needed.   Psychiatric consultation was done yesterday.   Continue daily weights.  Nutritionist consult to be done yesterday.  Discuss with GI the treatment option of NGT enteral feedings or TPN yesterday they did not recommend

## 2017-01-12 NOTE — PROGRESS NOTE ADULT - SUBJECTIVE AND OBJECTIVE BOX
Pt seen and examined ; F/U for nausea and vomiting  This AM complains of constipation with occasional vague lower abdominal pain. Still with nausea and occasional vomiting but significantly less.Notes epigastric pain after she vomits.    REVIEW OF SYSTEMS:    CONSTITUTIONAL: No fever, weight loss, or fatigue  EYES: No eye pain, visual disturbances, or discharge  ENMT:  No difficulty hearing, tinnitus, vertigo; No sinus or throat pain  RESPIRATORY: No cough, wheezing, chills or hemoptysis; No shortness of breath  CARDIOVASCULAR: No chest pain, palpitations, dizziness, or leg swelling  GASTROINTESTINAL:as above    MEDICATIONS:  MEDICATIONS  (STANDING):  enoxaparin Injectable 40milliGRAM(s) SubCutaneous daily  dextrose 5% + lactated ringers with potassium chloride 20 mEq/L 1000milliLiter(s) IV Continuous <Continuous>  dextrose 5% + lactated ringers 1000milliLiter(s) IV Continuous <Continuous>  famotidine Injectable 20milliGRAM(s) IV Push every 12 hours  metoclopramide 10milliGRAM(s) Oral Before meals and at bedtime  ondansetron Injectable 6milliGRAM(s) IV Push every 6 hours    MEDICATIONS  (PRN):      Allergies    No Known Allergies    Intolerances        Vital Signs Last 24 Hrs  T(C): --  T(F): --  HR: --  BP: --  BP(mean): --  RR: --  SpO2: --    I & Os for current day (as of 01-12 @ 09:09)  =============================================  IN: 5981 ml / OUT: 2715 ml / NET: 3266 ml      PHYSICAL EXAM:    General: Well developed; well nourished; in no acute distress  HEENT: MMM, conjunctiva and sclera clear  Lungs: clear to auscultation and percussion.  Gastrointestinal:Abdomen: Soft non-tender non-distended; Normal bowel sounds; No hepatosplenomegaly  no surgical scars.Slight lower abdominal distention due to pregnancy  Extremities: no cyanosis, clubbing or edema.  Skin: Warm and dry. No obvious rash    LABS:      CBC Full  -  ( 11 Jan 2017 08:14 )  WBC Count : 7.18 K/uL  Hemoglobin : 10.9 g/dL  Hematocrit : 32.5 %  Platelet Count - Automated : 338 K/uL  Mean Cell Volume : 86.2 fl  Mean Cell Hemoglobin : 28.9 pg  Mean Cell Hemoglobin Concentration : 33.5 g/dL  Auto Neutrophil # : 4.6 K/uL  Auto Lymphocyte # : 1.9 K/uL  Auto Monocyte # : 0.6 K/uL  Auto Eosinophil # : 0.0 K/uL  Auto Basophil # : x  Auto Neutrophil % : 64.5 %  Auto Lymphocyte % : 26.6 %  Auto Monocyte % : 7.8 %  Auto Eosinophil % : 0.7 %  Auto Basophil % : x    11 Jan 2017 08:13    135    |  99     |  2.0    ----------------------------<  118    3.5     |  21.0   |  0.42     Ca    9.0        11 Jan 2017 08:13  Mg     1.7       11 Jan 2017 08:13    TPro  6.2    /  Alb  3.1    /  TBili  0.6    /  DBili  x      /  AST  133    /  ALT  210    /  AlkPhos  112    11 Jan 2017 08:13

## 2017-01-12 NOTE — PROGRESS NOTE ADULT - PROBLEM SELECTOR PLAN 1
improved but still symptomatic. Will change reglan to 10mg PO q1/2hr AC and qHS which is more effective than just IV q8hrs. Will change zofran to 6mg IV q6H rather than prn. Continue clear liquids.

## 2017-01-12 NOTE — PROGRESS NOTE ADULT - SUBJECTIVE AND OBJECTIVE BOX
spoke to patient who says she feels much better today, no nausea or vomiting today; resting comfortably; tolerating liquid diet well; will follow.

## 2017-01-12 NOTE — PROGRESS NOTE ADULT - SUBJECTIVE AND OBJECTIVE BOX
Patient is a 36 year old  Female, LMP 16, KESHAWN 17, EGA today is 25 weeks and 1 days, who presented with severe nausea and vomiting.  She is currently on clear diet and continues to have nausea and  with 1 episode of vomiting. She is known to have a twin pregnancy. She is being followed by gastroenterologists. She is currently receiving Reglan IV and  Zofran 4mg Q 6hours prn.  Patient is receiving  potassium and magnesium replacements yesterday. She is also getting a daily IV multivitamin. She is getting SQ Lovenox daily for DVT prophylaxis. Liver function test were noted to be elevated likely secondary to Protonix Rx  and Phenergan Rx  ( drug induced) was discontinued yesterday. She says she feels better today with more energy. As per attending on call liquids were attempted due patient feeling hungry and she wanted to try to tolerate clears.  She had one episode of vomiting last night. She had bowel movement 2 days ago.  Psychiatric evaluation was done no medication was recommended. .  She was found to have adjustment disorder with depressive mood.     OB Hx:  2 prior Full term spontaneous vaginal deliveries in her native country of [place].    Vital Signs Last 24 Hrs  T(C): --37.0  HR: --75  10 Kris 2017 23:15    135    |  98     |  <2.0   ----------------------------<  111    3.2     |  21.0   |  0.36     Ca    8.6        10 Kris 2017 23:15    TPro  6.1    /  Alb  3.1    /  TBili  0.6    /  DBili  x      /  AST  126    /  ALT  185    /  AlkPhos  113    10 Kris 2017 23:15  BP: --135/77  RR: --20  SpO2: --100    PHYSICAL EXAM:  GENERAL: NAD,   CHEST/LUNG: Clear to percussion bilaterally; No rales, rhonchi, wheezing, or rubs  HEART: Regular rate and rhythm; No murmurs, rubs, or gallops  ABDOMEN: Gravid, appropriate for gestational age, Nontender; Bowel sounds present  EXT: no calf tenderness bilaterally.    Fetal heart rate tracingreviewed- adequate for 25 weeks gestation.  LABS:                        10.9   7.18  )-----------( 338      ( 2017 08:14 )             32.5     2017 08:13    135    |  99     |  2.0    ----------------------------<  118    3.5     |  21.0   |  0.42     Ca    9.0        2017 08:13  Mg     1.7       2017 08:13    TPro  6.2    /  Alb  3.1    /  TBili  0.6    /  DBili  x      /  AST  133    /  ALT  210    /  AlkPhos  112    2017 08:13                          MFM OB Ultrasound on 16  Twin A- female fetus in breech presentation. MVP is 4.8 cm.  No major birth defects noted. EFW 1 lb 8 oz or 677gm. (42%).  Twin B- male fetus in transverse lie. MVP is 3.4 cm.  No major birth defects noted. EFW 1 lb 6oz or 625gm. (30%).

## 2017-01-12 NOTE — PROGRESS NOTE ADULT - ASSESSMENT
Patient is a 36 year old  Female, LMP 16, KESHAWN 17, EGA today is 25 weeks and 1 days, who has persistent nausea and 1 episode vomiting today.  She has a twin gestation, and advanced maternal age. She is suspected to have Zika virus exposure since she came from [place] where there is active Zika virus transmission and she reported having mosquito bites during pregnancy.  She tested positive for Coronovirus.

## 2017-01-13 DIAGNOSIS — O21.9 VOMITING OF PREGNANCY, UNSPECIFIED: ICD-10-CM

## 2017-01-13 LAB
ALBUMIN SERPL ELPH-MCNC: 3.2 G/DL — LOW (ref 3.3–5.2)
ALP SERPL-CCNC: 118 U/L — SIGNIFICANT CHANGE UP (ref 40–120)
ALT FLD-CCNC: 317 U/L — HIGH
ANION GAP SERPL CALC-SCNC: 14 MMOL/L — SIGNIFICANT CHANGE UP (ref 5–17)
AST SERPL-CCNC: 176 U/L — HIGH
BILIRUB SERPL-MCNC: 0.6 MG/DL — SIGNIFICANT CHANGE UP (ref 0.4–2)
BUN SERPL-MCNC: 2 MG/DL — LOW (ref 8–20)
CALCIUM SERPL-MCNC: 8.9 MG/DL — SIGNIFICANT CHANGE UP (ref 8.6–10.2)
CHLORIDE SERPL-SCNC: 96 MMOL/L — LOW (ref 98–107)
CMV IGM SERPL-ACNC: <30 AU/ML — SIGNIFICANT CHANGE UP (ref 0–29.9)
CO2 SERPL-SCNC: 22 MMOL/L — SIGNIFICANT CHANGE UP (ref 22–29)
CREAT SERPL-MCNC: 0.46 MG/DL — LOW (ref 0.5–1.3)
CULTURE RESULTS: SIGNIFICANT CHANGE UP
GLUCOSE SERPL-MCNC: 101 MG/DL — SIGNIFICANT CHANGE UP (ref 70–115)
HSV 1+2 IGM SER-IMP: <0.91 RATIO — SIGNIFICANT CHANGE UP (ref 0–0.9)
INR BLD: 0.94 RATIO — SIGNIFICANT CHANGE UP (ref 0.88–1.16)
LIDOCAIN IGE QN: 37 U/L — SIGNIFICANT CHANGE UP (ref 22–51)
MAGNESIUM SERPL-MCNC: 1.9 MG/DL — SIGNIFICANT CHANGE UP (ref 1.8–2.5)
MEV IGM SER-ACNC: <20 AU/ML — SIGNIFICANT CHANGE UP (ref 0–19.9)
POTASSIUM SERPL-MCNC: 3.9 MMOL/L — SIGNIFICANT CHANGE UP (ref 3.5–5.3)
POTASSIUM SERPL-SCNC: 3.9 MMOL/L — SIGNIFICANT CHANGE UP (ref 3.5–5.3)
PROT SERPL-MCNC: 6.5 G/DL — LOW (ref 6.6–8.7)
PROTHROM AB SERPL-ACNC: 10.3 SEC — SIGNIFICANT CHANGE UP (ref 10–13.1)
SODIUM SERPL-SCNC: 132 MMOL/L — LOW (ref 135–145)
SPECIMEN SOURCE: SIGNIFICANT CHANGE UP
T GONDII IGM SER IA-ACNC: <3 AU/ML — SIGNIFICANT CHANGE UP (ref 0–7.9)

## 2017-01-13 PROCEDURE — 99232 SBSQ HOSP IP/OBS MODERATE 35: CPT | Mod: GC

## 2017-01-13 RX ORDER — ONDANSETRON 8 MG/1
6 TABLET, FILM COATED ORAL EVERY 6 HOURS
Qty: 0 | Refills: 0 | Status: DISCONTINUED | OUTPATIENT
Start: 2017-01-13 | End: 2017-01-14

## 2017-01-13 RX ORDER — FAMOTIDINE 10 MG/ML
20 INJECTION INTRAVENOUS
Qty: 0 | Refills: 0 | Status: DISCONTINUED | OUTPATIENT
Start: 2017-01-13 | End: 2017-01-14

## 2017-01-13 RX ADMIN — Medication 10 MILLIGRAM(S): at 16:56

## 2017-01-13 RX ADMIN — Medication 10 MILLIGRAM(S): at 11:41

## 2017-01-13 RX ADMIN — ONDANSETRON 6 MILLIGRAM(S): 8 TABLET, FILM COATED ORAL at 06:36

## 2017-01-13 RX ADMIN — FAMOTIDINE 20 MILLIGRAM(S): 10 INJECTION INTRAVENOUS at 17:11

## 2017-01-13 RX ADMIN — Medication 10 MILLIGRAM(S): at 07:44

## 2017-01-13 RX ADMIN — FAMOTIDINE 20 MILLIGRAM(S): 10 INJECTION INTRAVENOUS at 06:15

## 2017-01-13 RX ADMIN — Medication 1 TABLET(S): at 12:08

## 2017-01-13 RX ADMIN — Medication 10 MILLIGRAM(S): at 21:34

## 2017-01-13 RX ADMIN — SODIUM CHLORIDE 150 MILLILITER(S): 9 INJECTION, SOLUTION INTRAVENOUS at 01:03

## 2017-01-13 RX ADMIN — ONDANSETRON 6 MILLIGRAM(S): 8 TABLET, FILM COATED ORAL at 00:37

## 2017-01-13 RX ADMIN — ENOXAPARIN SODIUM 40 MILLIGRAM(S): 100 INJECTION SUBCUTANEOUS at 12:09

## 2017-01-13 NOTE — DISCHARGE NOTE OB - CARE PROVIDER_API CALL
Garry Bazan), Obstetrics and Gynecology  [address] [place] [address]  Phone: [phone]  Fax: [phone]    Patrice Meek), MaternalFetal Medicine; Obstetrics and Gynecology  [address] [place] [address]  Phone: [phone]  Fax: [phone]

## 2017-01-13 NOTE — PROGRESS NOTE ADULT - PROBLEM SELECTOR PLAN 1
Pt's clinical condition has significantly improved. Will advance diet to low fat today and observe for tolerance. Continue current PO Reglan ac & hs and IV Zofran ATC for now, Repeat labs ordered for the AM.

## 2017-01-13 NOTE — PROGRESS NOTE ADULT - PROBLEM SELECTOR PLAN 1
Advance diet as per GI to low fat today.  IV Zofran prn.   Continue daily weights.  Nutritionist consult to speak with patient regarding diet to follow as an outpatient.  Montior LFT's and metabolic profile till discharge.

## 2017-01-13 NOTE — DISCHARGE NOTE OB - HOSPITAL COURSE
hyperemesis 36 year old  Female, LMP 16, KESHAWN 17, EGA today is 25 weeks and 3 days, who presents with a chief complaint of nausea and vomiting for the past 3 days.  She states that she has been having nausea since her arrival in [place].  [place] from [place] on [place].  She was hospitalized in [place] for approximately 15 days for severe nausea and vomiting.  She is known to have a twin pregnancy. On arrival to [facility], she was noted to have left costovertebral angle tenderness.   presumptive cystitis and was given one dose of IV abx.  She was then managed with IV fluids, Bicitra IV Phenergan IV Zofran IV Reglan dosing.  On Admission UA revealed large ketones. Her ketones resolved maintenance D5 RL IV fluids. She was given daily multivitamin IV supplementation which includes thiamine. She is NPO then advanced slowly to low fat diet.  Patient noted to have a low potassium level and magnesium levels that was replaced.  She received potassium and Magnesium supplementation. She was noted to have a positive Rapid respiratory viral panel swab for Coronovirus.ZIka panel was sent due to recent travel from [place] to  and Infectious disease was on board.  She was seen by Psychiatric and GI and Nutritionist to assist us with management of her care.  She had elevated LFT that were monitored closely and needs to be followed closely as outpatient she has negative hepatitis panel.  BARBIE was on board closely for management of care she was seen daily and had serial US done for evaluations of twin pregnancy.  No gross fetal anomalies were found and NST's were done daily.   She is now ready for discharge and needs close followup as outpatient with [facility]  OB doctors and BARBIE.

## 2017-01-13 NOTE — DISCHARGE NOTE OB - ADDITIONAL INSTRUCTIONS
Follow up with  on 1/17/17 at 3:30pm at [facility] clinic in [facility]

## 2017-01-13 NOTE — PROGRESS NOTE ADULT - SUBJECTIVE AND OBJECTIVE BOX
Pt seen and examined She states that she is feeling much better and is no longer nauseous. She is presently tolerating a clear liquid diet.      MEDICATIONS:  MEDICATIONS  (STANDING):  enoxaparin Injectable 40milliGRAM(s) SubCutaneous daily  dextrose 5% + lactated ringers with potassium chloride 20 mEq/L 1000milliLiter(s) IV Continuous <Continuous>  dextrose 5% + lactated ringers 1000milliLiter(s) IV Continuous <Continuous>  famotidine Injectable 20milliGRAM(s) IV Push every 12 hours  metoclopramide 10milliGRAM(s) Oral Before meals and at bedtime  ondansetron Injectable 6milliGRAM(s) IV Push every 6 hours    MEDICATIONS  (PRN):  magnesium hydroxide Suspension 30milliLiter(s) Oral daily PRN Constipation      Allergies    No Known Allergies    Intolerances        Vital Signs Last 24 Hrs  T(C): --  T(F): --  HR: --  BP: --  BP(mean): --  RR: --  SpO2: --    I & Os for current day (as of 01-13 @ 07:00)  =============================================  IN: 5592 ml / OUT: 800 ml / NET: 4792 ml      PHYSICAL EXAM:    General: Well developed; well nourished; in no acute distress  HEENT: MMM, conjunctiva and sclera clear  Lungs: clear to auscultation and percussion.  Cor: RRR S1, S2 only w/o mrg or clicks  Gastrointestinal: Abdomen: Soft non-tender non-distended; Normal bowel sounds; No hepatosplenomegaly  + IUP  Extremities: no cyanosis, clubbing or edema.  Skin: Warm and dry. No obvious rash  Neuro: Pt. a + o x 3    LABS:      CBC Full  -  ( 11 Jan 2017 08:14 )  WBC Count : 7.18 K/uL  Hemoglobin : 10.9 g/dL  Hematocrit : 32.5 %  Platelet Count - Automated : 338 K/uL  Mean Cell Volume : 86.2 fl  Mean Cell Hemoglobin : 28.9 pg  Mean Cell Hemoglobin Concentration : 33.5 g/dL  Auto Neutrophil # : 4.6 K/uL  Auto Lymphocyte # : 1.9 K/uL  Auto Monocyte # : 0.6 K/uL  Auto Eosinophil # : 0.0 K/uL  Auto Basophil # : x  Auto Neutrophil % : 64.5 %  Auto Lymphocyte % : 26.6 %  Auto Monocyte % : 7.8 %  Auto Eosinophil % : 0.7 %  Auto Basophil % : x    12 Jan 2017 14:14    132    |  96     |  2.0    ----------------------------<  104    3.8     |  23.0   |  0.38     Ca    9.1        12 Jan 2017 14:14  Mg     1.9       12 Jan 2017 14:14    TPro  6.5    /  Alb  3.3    /  TBili  0.7    /  DBili  0.3    /  AST  166    /  ALT  289    /  AlkPhos  117    12 Jan 2017 14:14                      RADIOLOGY & ADDITIONAL STUDIES (The following images were personally reviewed):

## 2017-01-13 NOTE — PROGRESS NOTE ADULT - SUBJECTIVE AND OBJECTIVE BOX
Patient is a 36 year old  Female, LMP 16, KESHAWN 17, EGA today is 25 weeks and 2 days, who presented with severe nausea and vomiting.  She is currently on clear diet. She is no longer nausea and has not had an episode of vomiting for the past 2 days. She was seen by GI consultant this morning who recommended advancing her diet to low fat. She is known to have a twin pregnancy. She is currently receiving Reglan po  and  Zofran 4mg Q 6hours prn. Her hypokalemia and hypomagnesia have been resolved.  She is also getting a daily IV multivitamin. She is getting SQ Lovenox daily for DVT prophylaxis. Liver function test remain elevated. She says she feels better today with more energy. She looks cheerful and is eager to attempt solid food today. She had bowel movement yesterday.    OB Hx:  2 prior Full term spontaneous vaginal deliveries in her native country of [place].    Vital Signs Last 24 Hrs  T(C): --36.7  HR: --78  /76    PHYSICAL EXAM:  GENERAL: NAD,   CHEST/LUNG: Clear to percussion bilaterally; No rales, rhonchi, wheezing, or rubs  HEART: Regular rate and rhythm; No murmurs, rubs, or gallops  ABDOMEN: Gravid, appropriate for gestational age, Nontender; Bowel sounds present  EXT: no calf tenderness bilaterally.    Fetal heart rate tracing reviewed- 10x10 accelerations for both fetuses noted.    LABS:                        10.9   7.18  )-----------( 338      ( 2017 08:14 )             32.5     2017 06:48    132    |  96     |  2.0    ----------------------------<  101    3.9     |  22.0   |  0.46     Ca    8.9        2017 06:48  Mg     1.9       2017 06:48    TPro  6.5    /  Alb  3.2    /  TBili  0.6    /  DBili  x      /  AST  176    /  ALT  317    /  AlkPhos  118    2017 06:48 Patient is a 36 year old  Female, LMP 16, KESHAWN 17, EGA today is 25 weeks and 2 days, who presented with severe nausea and vomiting.  She is currently on clear diet. She is no longer nausea and has not had an episode of vomiting for the past 2 days. She was seen by GI consultant this morning who recommended advancing her diet to low fat. She is known to have a twin pregnancy. She is currently receiving Reglan po  and  Zofran 4mg Q 6hours prn. Her hypokalemia and hypomagnesia have been resolved.  She is also getting a daily IV multivitamin. She is getting SQ Lovenox daily for DVT prophylaxis. Liver function test remain elevated. She says she feels better today with more energy. She looks cheerful and is eager to attempt solid food today. She had bowel movement yesterday. Her weight today is 156lbs.    OB Hx:  2 prior Full term spontaneous vaginal deliveries in her native country of [place].    Vital Signs Last 24 Hrs  T(C): --36.7  HR: --78  /76    PHYSICAL EXAM:  GENERAL: NAD, resting in bed.  CHEST/LUNG: Clear to percussion bilaterally; No rales, rhonchi, wheezing, or rubs  HEART: Regular rate and rhythm; No murmurs, rubs, or gallops  ABDOMEN: Gravid, appropriate for gestational age, Nontender; Bowel sounds present  EXT: no calf tenderness bilaterally.    Fetal heart rate tracing reviewed and there were 10x10 accelerations for both fetuses.    LABS:                        10.9   7.18  )-----------( 338      ( 2017 08:14 )             32.5     2017 06:48    132    |  96     |  2.0    ----------------------------<  101    3.9     |  22.0   |  0.46     Ca    8.9        2017 06:48  Mg     1.9       2017 06:48    TPro  6.5    /  Alb  3.2    /  TBili  0.6    /  DBili  x      /  AST  176    /  ALT  317    /  AlkPhos  118    2017 06:48

## 2017-01-13 NOTE — DISCHARGE NOTE OB - PLAN OF CARE
low fat diet remain nausea free management Follow up with  on 1/17/17 at 3:30pm at [facility] clinic in [facility] Follow up with  on 1/17/17 at 3:30pm at [facility] clinic in [facility] Follow up with  on 1/17/17 at 3:30pm at [facility] clinic in [facility] Follow up with  on 1/17/17 at 3:30pm at [facility] clinic in [facility] low fat diet  Follow up with  on 1/17/17 at 3:30pm at [facility] clinic in [facility]

## 2017-01-13 NOTE — PROGRESS NOTE ADULT - ASSESSMENT
Patient is a 36 year old  Female, LMP 16, KESHAWN 17, EGA today is 25 weeks and 2 days, who was admitted for severe hyperemesis gravidarum.  She has a twin gestation, and advanced maternal age. She is suspected to have Zika virus exposure since she came from [place] where there is active Zika virus transmission and she reported having mosquito bites during pregnancy.  She tested positive for  Coronavirus.

## 2017-01-13 NOTE — DISCHARGE NOTE OB - MEDICATION SUMMARY - MEDICATIONS TO TAKE
I will START or STAY ON the medications listed below when I get home from the hospital:    prenantal vitamins  -- 1 tab(s) by mouth once a day  -- Indication: For Vitamins    ondansetron 4 mg oral tablet, disintegrating  -- 1 tab(s) by mouth 3 times a day, As Needed nasuea or vomiting  -- Indication: For Nausea and vomiting during pregnancy    metoclopramide 10 mg oral tablet  -- 1 tab(s) by mouth 4 times a day (before meals and at bedtime)  -- Indication: For Nausea and vomiting during pregnancy    famotidine 20 mg oral tablet  -- 1 tab(s) by mouth 2 times a day  -- Indication: For reflux

## 2017-01-13 NOTE — DISCHARGE NOTE OB - CARE PLAN
Goal:	remain nausea free  Instructions for follow-up, activity and diet:	low fat diet Principal Discharge DX:	Hyperemesis gravidarum with electrolyte imbalance  Goal:	remain nausea free  Instructions for follow-up, activity and diet:	low fat diet  Follow up with  on 1/17/17 at 3:30pm at [facility] clinic in [facility]  Secondary Diagnosis:	Twin dichorionic diamniotic placenta  Goal:	management  Instructions for follow-up, activity and diet:	Follow up with  on 1/17/17 at 3:30pm at [facility] clinic in [facility]  Secondary Diagnosis:	Viral syndrome  Goal:	management  Instructions for follow-up, activity and diet:	Follow up with  on 1/17/17 at 3:30pm at [facility] clinic in [facility]  Secondary Diagnosis:	GERD (gastroesophageal reflux disease)  Goal:	management  Instructions for follow-up, activity and diet:	Follow up with  on 1/17/17 at 3:30pm at [facility] clinic in [facility]  Secondary Diagnosis:	Elevated LFTs  Instructions for follow-up, activity and diet:	Follow up with  on 1/17/17 at 3:30pm at [facility] clinic in [facility]

## 2017-01-13 NOTE — DISCHARGE NOTE OB - SECONDARY DIAGNOSIS.
Twin dichorionic diamniotic placenta Viral syndrome GERD (gastroesophageal reflux disease) Elevated LFTs

## 2017-01-13 NOTE — DISCHARGE NOTE OB - PATIENT PORTAL LINK FT
“You can access the Follow[facility] Patient Portal, offered by [facility], by registering with the following website: [email or web address]”

## 2017-01-14 VITALS
TEMPERATURE: 98 F | RESPIRATION RATE: 18 BRPM | HEART RATE: 83 BPM | SYSTOLIC BLOOD PRESSURE: 99 MMHG | DIASTOLIC BLOOD PRESSURE: 65 MMHG

## 2017-01-14 LAB
ALBUMIN SERPL ELPH-MCNC: 3.2 G/DL — LOW (ref 3.3–5.2)
ALP SERPL-CCNC: 117 U/L — SIGNIFICANT CHANGE UP (ref 40–120)
ALT FLD-CCNC: 345 U/L — HIGH
ANION GAP SERPL CALC-SCNC: 15 MMOL/L — SIGNIFICANT CHANGE UP (ref 5–17)
AST SERPL-CCNC: 188 U/L — HIGH
BILIRUB DIRECT SERPL-MCNC: 0.2 MG/DL — SIGNIFICANT CHANGE UP (ref 0–0.3)
BILIRUB INDIRECT FLD-MCNC: 0.2 MG/DL — SIGNIFICANT CHANGE UP (ref 0.2–1)
BILIRUB SERPL-MCNC: 0.4 MG/DL — SIGNIFICANT CHANGE UP (ref 0.4–2)
BUN SERPL-MCNC: 3 MG/DL — LOW (ref 8–20)
CALCIUM SERPL-MCNC: 9 MG/DL — SIGNIFICANT CHANGE UP (ref 8.6–10.2)
CHLORIDE SERPL-SCNC: 97 MMOL/L — LOW (ref 98–107)
CO2 SERPL-SCNC: 22 MMOL/L — SIGNIFICANT CHANGE UP (ref 22–29)
CREAT SERPL-MCNC: 0.42 MG/DL — LOW (ref 0.5–1.3)
EOSINOPHIL # BLD AUTO: 0.1 K/UL — SIGNIFICANT CHANGE UP (ref 0–0.5)
EOSINOPHIL NFR BLD AUTO: 1 % — SIGNIFICANT CHANGE UP (ref 0–6)
GLUCOSE SERPL-MCNC: 85 MG/DL — SIGNIFICANT CHANGE UP (ref 70–115)
HCT VFR BLD CALC: 33 % — LOW (ref 37–47)
HGB BLD-MCNC: 11.1 G/DL — LOW (ref 12–16)
LYMPHOCYTES # BLD AUTO: 1.8 K/UL — SIGNIFICANT CHANGE UP (ref 1–4.8)
LYMPHOCYTES # BLD AUTO: 24.5 % — SIGNIFICANT CHANGE UP (ref 20–55)
MCHC RBC-ENTMCNC: 28.9 PG — SIGNIFICANT CHANGE UP (ref 27–31)
MCHC RBC-ENTMCNC: 33.6 G/DL — SIGNIFICANT CHANGE UP (ref 32–36)
MCV RBC AUTO: 85.9 FL — SIGNIFICANT CHANGE UP (ref 81–99)
MONOCYTES # BLD AUTO: 0.6 K/UL — SIGNIFICANT CHANGE UP (ref 0–0.8)
MONOCYTES NFR BLD AUTO: 8.6 % — SIGNIFICANT CHANGE UP (ref 3–10)
NEUTROPHILS # BLD AUTO: 4.8 K/UL — SIGNIFICANT CHANGE UP (ref 1.8–8)
NEUTROPHILS NFR BLD AUTO: 65.4 % — SIGNIFICANT CHANGE UP (ref 37–73)
PLATELET # BLD AUTO: 358 K/UL — SIGNIFICANT CHANGE UP (ref 150–400)
POTASSIUM SERPL-MCNC: 3.7 MMOL/L — SIGNIFICANT CHANGE UP (ref 3.5–5.3)
POTASSIUM SERPL-SCNC: 3.7 MMOL/L — SIGNIFICANT CHANGE UP (ref 3.5–5.3)
PROT SERPL-MCNC: 6.3 G/DL — LOW (ref 6.6–8.7)
RBC # BLD: 3.84 M/UL — LOW (ref 4.4–5.2)
RBC # FLD: 13.4 % — SIGNIFICANT CHANGE UP (ref 11–15.6)
SODIUM SERPL-SCNC: 134 MMOL/L — LOW (ref 135–145)
WBC # BLD: 7.36 K/UL — SIGNIFICANT CHANGE UP (ref 4.8–10.8)
WBC # FLD AUTO: 7.36 K/UL — SIGNIFICANT CHANGE UP (ref 4.8–10.8)

## 2017-01-14 PROCEDURE — 99232 SBSQ HOSP IP/OBS MODERATE 35: CPT | Mod: GC

## 2017-01-14 RX ORDER — METOCLOPRAMIDE HCL 10 MG
1 TABLET ORAL
Qty: 12 | Refills: 0 | OUTPATIENT
Start: 2017-01-14 | End: 2017-01-17

## 2017-01-14 RX ORDER — INFLUENZA VIRUS VACCINE 15; 15; 15; 15 UG/.5ML; UG/.5ML; UG/.5ML; UG/.5ML
0.5 SUSPENSION INTRAMUSCULAR ONCE
Qty: 0 | Refills: 0 | Status: DISCONTINUED | OUTPATIENT
Start: 2017-01-14 | End: 2017-01-14

## 2017-01-14 RX ORDER — ONDANSETRON 8 MG/1
1 TABLET, FILM COATED ORAL
Qty: 9 | Refills: 0 | OUTPATIENT
Start: 2017-01-14 | End: 2017-01-17

## 2017-01-14 RX ORDER — FAMOTIDINE 10 MG/ML
1 INJECTION INTRAVENOUS
Qty: 6 | Refills: 0 | OUTPATIENT
Start: 2017-01-14 | End: 2017-01-17

## 2017-01-14 RX ADMIN — Medication 10 MILLIGRAM(S): at 07:01

## 2017-01-14 RX ADMIN — Medication 10 MILLIGRAM(S): at 13:09

## 2017-01-14 RX ADMIN — ENOXAPARIN SODIUM 40 MILLIGRAM(S): 100 INJECTION SUBCUTANEOUS at 13:09

## 2017-01-14 RX ADMIN — Medication 1 TABLET(S): at 13:09

## 2017-01-14 RX ADMIN — FAMOTIDINE 20 MILLIGRAM(S): 10 INJECTION INTRAVENOUS at 08:23

## 2017-01-14 NOTE — PROGRESS NOTE ADULT - SUBJECTIVE AND OBJECTIVE BOX
Patient is a 36 year old  Female, LMP 16, KESHAWN 17, EGA today is 25 weeks and 3 days, who presented with severe nausea and vomiting.  She is currently on low fat diet. She is no longer having nausea or vomiting  for the past 2 days. She was seen by GI consultant this morning who recommended oral medication and no further intervention at this time. She is known to have a twin pregnancy. She is currently receiving Reglan po  and  Zofran 4mg Q 6hours prn.  Liver function test remain elevated and will be followed.  She says she feels better and has more energy. She looks cheerful and is tolerating solid foods for 2 days.  She had bowel movement yesterday.      OB Hx:  2 prior Full term spontaneous vaginal deliveries in her native country of [place].    Vital Signs Last 24 Hrs  T(C): 36.7, Max: 36.8 ( @ 16:48)  T(F): 98.1, Max: 98.3 ( @ 16:48)  HR: 83 (83 - 95)  BP: 99/65 (99/65 - 123/75)  BP(mean): --  RR: 18 (18 - 18)  SpO2: 97% (97% - 99%)    PHYSICAL EXAM:  GENERAL: NAD, resting in bed.  CHEST/LUNG: Clear to percussion bilaterally; No rales, rhonchi, wheezing, or rubs  HEART: Regular rate and rhythm; No murmurs, rubs, or gallops  ABDOMEN: Gravid, appropriate for gestational age, Nontender; Bowel sounds present  EXT: no calf tenderness bilaterally.      LABS:                        11.1   7.36  )-----------( 358      ( 2017 07:29 )             33.0     2017 07:29    134    |  97     |  3.0    ----------------------------<  85     3.7     |  22.0   |  0.42     Ca    9.0        2017 07:29  Mg     1.9       2017 06:48    TPro  6.5    /  Alb  3.2    /  TBili  0.6    /  DBili  x      /  AST  176    /  ALT  317    /  AlkPhos  118    2017 06:48

## 2017-01-14 NOTE — PROGRESS NOTE ADULT - ASSESSMENT
Patient is a 36 year old  Female, LMP 16, KESHAWN 17, EGA today is 25 weeks and 3 days, who was admitted for severe hyperemesis gravidarum.  She has a twin gestation, and advanced maternal age.She  is now tolerating solids no vomiting for 2 days.  Doing well

## 2017-01-14 NOTE — PROGRESS NOTE ADULT - PROBLEM SELECTOR PLAN 7
IV Pepcid 20mg changed to IV Protonix 40mg twice daily as per GI consultation recommendation.
Refer to [facility] clinic upon hospital discharge.
Refer to [facility] clinic upon hospital discharge.
Refer to [facility] clinic upon hospital discharge.  Follow up with [facility] provider upon discharge.
Refer to [facility] clinic upon hospital discharge.  Follow up with [facility] provider upon discharge.
Pepcid 20mg IV Q daily.

## 2017-01-14 NOTE — PROGRESS NOTE ADULT - PROBLEM SELECTOR PROBLEM 5
Viral syndrome
No prenatal care in current pregnancy in second trimester
Viral syndrome

## 2017-01-14 NOTE — PROGRESS NOTE ADULT - ASSESSMENT
Patient is a 36 year old  Female, LMP 16, KESHAWN 17, EGA today is 25 weeks and 3 days, who was admitted for severe hyperemesis gravidarum.  She has a twin gestation, and advanced maternal age. She is suspected to have Zika virus exposure since she came from [place] where there is active Zika virus transmission and she reported having mosquito bites during pregnancy.  She tested positive for  Coronavirus.

## 2017-01-14 NOTE — PROGRESS NOTE ADULT - SUBJECTIVE AND OBJECTIVE BOX
36 year old  Female, LMP 16, KESHAWN 17, EGA today is 25 weeks and 3 days, who presented with severe nausea and vomiting.  She is currently on low fat diet. She is no longer having nausea or vomiting  for the past 2 days. She is tolerating solid foods for 2 days.  She had bowel movement yesterday.      OB Hx:  2 prior Full term spontaneous vaginal deliveries in her native country of [place].    Vital Signs Last 24 Hrs  T(C): 36.7, Max: 36.8 ( @ 16:48)  T(F): 98.1, Max: 98.3 ( @ 16:48)  HR: 83 (83 - 95)  BP: 99/65 (99/65 - 123/75)  BP(mean): --  RR: 18 (18 - 18)  SpO2: 97% (97% - 99%)    PHYSICAL EXAM:  GENERAL: NAD, resting in bed.  CHEST/LUNG: Clear to percussion bilaterally; No rales, rhonchi, wheezing, or rubs  HEART: Regular rate and rhythm; No murmurs, rubs, or gallops  ABDOMEN: Gravid, appropriate for gestational age, Nontender; Bowel sounds present  EXT: no calf tenderness bilaterally.      LABS:                        11.1   7.36  )-----------( 358      ( 2017 07:29 )             33.0     2017 07:29    134    |  97     |  3.0    ----------------------------<  85     3.7     |  22.0   |  0.42     Ca    9.0        2017 07:29  Mg     1.9       2017 06:48    TPro  6.5    /  Alb  3.2    /  TBili  0.6    /  DBili  x      /  AST  176    /  ALT  317    /  AlkPhos  118    2017 06:48

## 2017-01-14 NOTE — PROGRESS NOTE ADULT - SUBJECTIVE AND OBJECTIVE BOX
Pt seen and examined She was able to tolerate yesterday's diet advancement to low fat w/o recurrent nausea or vomiting, Her only complaint this AM is that she couldn't sleep last night but not because she was nauseous.    MEDICATIONS:  MEDICATIONS  (STANDING):  enoxaparin Injectable 40milliGRAM(s) SubCutaneous daily  metoclopramide 10milliGRAM(s) Oral Before meals and at bedtime  prenatal multivitamin 1Tablet(s) Oral daily  famotidine    Tablet 20milliGRAM(s) Oral two times a day  influenza   Vaccine 0.5milliLiter(s) IntraMuscular once    MEDICATIONS  (PRN):  magnesium hydroxide Suspension 30milliLiter(s) Oral daily PRN Constipation  ondansetron Injectable 6milliGRAM(s) IV Push every 6 hours PRN Nausea and/or Vomiting      Allergies    No Known Allergies    Intolerances        Vital Signs Last 24 Hrs  T(C): 36.8, Max: 36.8 (01-13 @ 16:48)  T(F): 98.3, Max: 98.3 (01-13 @ 16:48)  HR: 95 (90 - 95)  BP: 123/75 (111/76 - 123/75)  BP(mean): --  RR: 18 (18 - 18)  SpO2: 97% (97% - 99%)    I & Os for current day (as of 01-14 @ 07:01)  =============================================  IN: 400 ml / OUT: 1900 ml / NET: -1500 ml      PHYSICAL EXAM:    General: Well developed; well nourished; in no acute distress  HEENT: MMM, conjunctiva and sclera non icteric.  Lungs: clear to auscultation and percussion.  Cor: RRR S1, S2 only.  Gastrointestinal: Abdomen: Soft non-tender non-distended; Normal bowel sounds; No hepatosplenomegaly  + IUP.  Extremities: no cyanosis, clubbing or edema.  Skin: Warm and dry. No obvious rash  Neuro: Pt. a + o x 3.    LABS:        13 Jan 2017 06:48    132    |  96     |  2.0    ----------------------------<  101    3.9     |  22.0   |  0.46     Ca    8.9        13 Jan 2017 06:48  Mg     1.9       13 Jan 2017 06:48    TPro  6.5    /  Alb  3.2    /  TBili  0.6    /  DBili  x      /  AST  176    /  ALT  317    /  AlkPhos  118    13 Jan 2017 06:48    PT/INR - ( 13 Jan 2017 06:48 )   PT: 10.3 sec;   INR: 0.94 ratio                           RADIOLOGY & ADDITIONAL STUDIES (The following images were personally reviewed):

## 2017-01-14 NOTE — PROGRESS NOTE ADULT - PROVIDER SPECIALTY LIST ADULT
Gastroenterology
MFWADE
OB
OB

## 2017-01-14 NOTE — PROGRESS NOTE ADULT - PROBLEM SELECTOR PROBLEM 4
Zika virus exposure affecting pregnancy
GERD (gastroesophageal reflux disease)
Zika virus exposure affecting pregnancy

## 2017-01-14 NOTE — PROGRESS NOTE ADULT - PROBLEM SELECTOR PLAN 4
Continue oral Pepcid 20mg twice daily as outpatient.
Waiting for Zika test results.  Follow up with [facility] provider upon discharge.
Waiting for Zika test results.  Follow up with [facility] provider upon discharge.
Zika testing done yesterday.
Will contact Infectious disease for Zika testing authorization.

## 2017-01-14 NOTE — PROGRESS NOTE ADULT - PROBLEM SELECTOR PLAN 3
[facility] OB ultrasound done yesterday revealed no major birth defects in both fetuses.  There was concordant fetal growth with twin A being larger than twin B.
[facility] OB ultrasound revealed no major birth defects in both fetuses.  There was concordant fetal growth with twin A being larger than twin B.  Follow up with [facility] provider upon discharge.
[facility] OB ultrasound revealed no major birth defects in both fetuses.  There was concordant fetal growth with twin A being larger than twin B.  Follow up with [facility] provider upon discharge.
[facility] OB ultrasound revealed no major birth defects in both fetuses.  There was concordant fetal growth with twin A being larger than twin B.
[facility] OB ultrasound revealed no major birth defects in both fetuses.  There was concordant fetal growth with twin A being larger than twin B.
etiology unclear but will moniter.
resolved and improved.
Same reglan.  Defer on NGT as no recent vomiting.  No need for dobhoff currently.
continue protonix
[facility] OB ultrasound today.

## 2017-01-14 NOTE — PROGRESS NOTE ADULT - PROBLEM SELECTOR PLAN 2
[facility] OB ultrasound revealed no major birth defects in both fetuses.  Follow up with [facility] provider upon discharge.
[facility] OB ultrasound revealed no major birth defects in both fetuses.
[facility] OB ultrasound revealed no major birth defects in both fetuses.  There was concordant fetal growth with twin A being larger than twin B.  Follow up with [facility] provider upon discharge.
[facility] OB ultrasound done yesterday revealed no major birth defects in both fetuses.
[facility] OB ultrasound revealed no major birth defects in both fetuses.
[facility] OB ultrasound revealed no major birth defects in both fetuses.  Follow up with M provider upon discharge.
Will give MOM
Modest transaminitis.  Nonspecific.  Normal platelets.  Neg Hepatitis profiles.  Continue to monitor.
continue stool softeners,  Unable to take laxatives po and would defer rectal approach.
[facility] OB ultrasound today.

## 2017-01-14 NOTE — PROGRESS NOTE ADULT - PROBLEM SELECTOR PROBLEM 8
No prenatal care in current pregnancy in second trimester
25 weeks gestation of pregnancy
No prenatal care in current pregnancy in second trimester

## 2017-01-14 NOTE — PROGRESS NOTE ADULT - PROBLEM SELECTOR PLAN 1
Her N/V appears to have resolved. Would continue PO Reglan 10 mg. ac & hs and Zofran PRN  and PO Famotidine and low fat diet. No need for continued GI f/u. Signing off. Reconsult as needed.

## 2017-01-14 NOTE — PROGRESS NOTE ADULT - ATTENDING COMMENTS
Patient examined with resident. Recommendations discussed with resident, OB provider and L & D RN staff.
will advance diet in AM

## 2017-01-14 NOTE — PROGRESS NOTE ADULT - PROBLEM SELECTOR PLAN 1
Continue low fat diet.  Discharge home on po Reglan and Zofran prn for 3 days.  Follow  LFT's as outpatient.

## 2017-01-14 NOTE — PROGRESS NOTE ADULT - PROBLEM SELECTOR PROBLEM 2
Advanced maternal age in multigravida, second trimester
Twin dichorionic diamniotic placenta
Constipation, unspecified constipation type
25 weeks gestation of pregnancy
Constipation, unspecified constipation type
Elevated LFTs

## 2017-01-14 NOTE — PROGRESS NOTE ADULT - PROBLEM SELECTOR PROBLEM 1
Hyperemesis gravidarum with electrolyte imbalance
Nausea and vomiting during pregnancy
Nausea and vomiting during pregnancy
24 weeks gestation of pregnancy
Hyperemesis gravidarum with electrolyte imbalance
Hyperemesis gravidarum with electrolyte imbalance

## 2017-01-14 NOTE — PROGRESS NOTE ADULT - PROBLEM SELECTOR PLAN 8
[facility] OB ultrasound revealed no major birth defects in both fetuses.  Continue daily fetal surveillance during hospitalization.
[facility] OB ultrasound revealed no major birth defects in both fetuses.  Continue daily fetal surveillance during hospitalization.
[facility] OB ultrasound revealed no major birth defects in both fetuses.  Continue daily fetal surveillance during hospitalization.  Follow up with M provider upon discharge.
[facility] OB ultrasound revealed no major birth defects in both fetuses.  Continue daily fetal surveillance during hospitalization.  Follow up with M provider upon discharge.
Refer to [facility] clinic upon hospital discharge.
Refer to [facility] clinic upon hospital discharge.

## 2017-01-14 NOTE — PROGRESS NOTE ADULT - PROBLEM SELECTOR PLAN 9
Continue to trend LFT's daily.
Discontinue IV Phenergan and IV Protonix due to the possibility of liver function elevations being secondary to these medications.  Continue to trend LFT's daily
Follow up LFT's as outpatient.
Discontinue IV Phenergan and IV Protonix due to the possibility of liver function elevations being secondary to these medications.

## 2017-01-14 NOTE — PROGRESS NOTE ADULT - PROBLEM SELECTOR PROBLEM 3
Twin dichorionic diamniotic placenta
Viral syndrome
Elevated LFTs
Gastroesophageal reflux disease without esophagitis
Hyperemesis gravidarum with electrolyte imbalance

## 2017-01-14 NOTE — PROGRESS NOTE ADULT - PROBLEM SELECTOR PLAN 5
Continue supportive care.
Refer to [facility] clinic upon hospital discharge.  Follow up with [facility] provider upon discharge.  Discharge home today.
Continue supportive care.

## 2017-01-14 NOTE — PROGRESS NOTE ADULT - PROBLEM SELECTOR PROBLEM 6
24 weeks gestation of pregnancy
24 weeks gestation of pregnancy
Elevated LFTs
GERD (gastroesophageal reflux disease)

## 2017-01-14 NOTE — PROGRESS NOTE ADULT - PROBLEM SELECTOR PROBLEM 7
GERD (gastroesophageal reflux disease)
GERD (gastroesophageal reflux disease)
No prenatal care in current pregnancy in second trimester

## 2017-03-03 ENCOUNTER — OUTPATIENT (OUTPATIENT)
Dept: OUTPATIENT SERVICES | Facility: HOSPITAL | Age: 37
LOS: 1 days | End: 2017-03-03
Payer: COMMERCIAL

## 2017-03-03 DIAGNOSIS — O47.03 FALSE LABOR BEFORE 37 COMPLETED WEEKS OF GESTATION, THIRD TRIMESTER: ICD-10-CM

## 2017-03-03 PROCEDURE — T1013: CPT

## 2017-03-03 PROCEDURE — 76805 OB US >/= 14 WKS SNGL FETUS: CPT

## 2017-03-03 PROCEDURE — G0463: CPT

## 2017-03-03 PROCEDURE — 76805 OB US >/= 14 WKS SNGL FETUS: CPT | Mod: 26

## 2017-03-03 PROCEDURE — 76815 OB US LIMITED FETUS(S): CPT | Mod: 26

## 2017-03-03 PROCEDURE — 76819 FETAL BIOPHYS PROFIL W/O NST: CPT | Mod: 26,59

## 2017-03-03 PROCEDURE — 76830 TRANSVAGINAL US NON-OB: CPT | Mod: 26

## 2017-03-03 PROCEDURE — 76830 TRANSVAGINAL US NON-OB: CPT

## 2017-03-03 PROCEDURE — 59025 FETAL NON-STRESS TEST: CPT

## 2017-03-03 RX ORDER — ONDANSETRON 8 MG/1
1 TABLET, FILM COATED ORAL
Qty: 30 | Refills: 0 | OUTPATIENT
Start: 2017-03-03 | End: 2017-04-02

## 2017-03-03 RX ORDER — RANITIDINE HYDROCHLORIDE 150 MG/1
1 TABLET, FILM COATED ORAL
Qty: 30 | Refills: 0 | OUTPATIENT
Start: 2017-03-03 | End: 2017-04-02

## 2017-03-05 LAB
CANDIDA AB TITR SER: SIGNIFICANT CHANGE UP
G VAGINALIS DNA SPEC QL NAA+PROBE: DETECTED
T VAGINALIS SPEC QL WET PREP: SIGNIFICANT CHANGE UP

## 2017-03-12 PROCEDURE — 84145 PROCALCITONIN (PCT): CPT

## 2017-03-12 PROCEDURE — 85027 COMPLETE CBC AUTOMATED: CPT

## 2017-03-12 PROCEDURE — 87798 DETECT AGENT NOS DNA AMP: CPT

## 2017-03-12 PROCEDURE — 84132 ASSAY OF SERUM POTASSIUM: CPT

## 2017-03-12 PROCEDURE — 85610 PROTHROMBIN TIME: CPT

## 2017-03-12 PROCEDURE — 76820 UMBILICAL ARTERY ECHO: CPT

## 2017-03-12 PROCEDURE — 71045 X-RAY EXAM CHEST 1 VIEW: CPT

## 2017-03-12 PROCEDURE — 76705 ECHO EXAM OF ABDOMEN: CPT

## 2017-03-12 PROCEDURE — 87581 M.PNEUMON DNA AMP PROBE: CPT

## 2017-03-12 PROCEDURE — 84100 ASSAY OF PHOSPHORUS: CPT

## 2017-03-12 PROCEDURE — 80053 COMPREHEN METABOLIC PANEL: CPT

## 2017-03-12 PROCEDURE — 86694 HERPES SIMPLEX NES ANTBDY: CPT

## 2017-03-12 PROCEDURE — 83605 ASSAY OF LACTIC ACID: CPT

## 2017-03-12 PROCEDURE — 76775 US EXAM ABDO BACK WALL LIM: CPT

## 2017-03-12 PROCEDURE — 86901 BLOOD TYPING SEROLOGIC RH(D): CPT

## 2017-03-12 PROCEDURE — 87340 HEPATITIS B SURFACE AG IA: CPT

## 2017-03-12 PROCEDURE — 87633 RESP VIRUS 12-25 TARGETS: CPT

## 2017-03-12 PROCEDURE — 81001 URINALYSIS AUTO W/SCOPE: CPT

## 2017-03-12 PROCEDURE — 86703 HIV-1/HIV-2 1 RESULT ANTBDY: CPT

## 2017-03-12 PROCEDURE — 76812 OB US DETAILED ADDL FETUS: CPT

## 2017-03-12 PROCEDURE — 86645 CMV ANTIBODY IGM: CPT

## 2017-03-12 PROCEDURE — 36415 COLL VENOUS BLD VENIPUNCTURE: CPT

## 2017-03-12 PROCEDURE — 80307 DRUG TEST PRSMV CHEM ANLYZR: CPT

## 2017-03-12 PROCEDURE — 86762 RUBELLA ANTIBODY: CPT

## 2017-03-12 PROCEDURE — 80074 ACUTE HEPATITIS PANEL: CPT

## 2017-03-12 PROCEDURE — 84436 ASSAY OF TOTAL THYROXINE: CPT

## 2017-03-12 PROCEDURE — 76815 OB US LIMITED FETUS(S): CPT

## 2017-03-12 PROCEDURE — 83735 ASSAY OF MAGNESIUM: CPT

## 2017-03-12 PROCEDURE — 84481 FREE ASSAY (FT-3): CPT

## 2017-03-12 PROCEDURE — 86778 TOXOPLASMA ANTIBODY IGM: CPT

## 2017-03-12 PROCEDURE — T1013: CPT

## 2017-03-12 PROCEDURE — 86850 RBC ANTIBODY SCREEN: CPT

## 2017-03-12 PROCEDURE — 84480 ASSAY TRIIODOTHYRONINE (T3): CPT

## 2017-03-12 PROCEDURE — 80076 HEPATIC FUNCTION PANEL: CPT

## 2017-03-12 PROCEDURE — 85730 THROMBOPLASTIN TIME PARTIAL: CPT

## 2017-03-12 PROCEDURE — 86900 BLOOD TYPING SEROLOGIC ABO: CPT

## 2017-03-12 PROCEDURE — 76811 OB US DETAILED SNGL FETUS: CPT

## 2017-03-12 PROCEDURE — 86592 SYPHILIS TEST NON-TREP QUAL: CPT

## 2017-03-12 PROCEDURE — 82150 ASSAY OF AMYLASE: CPT

## 2017-03-12 PROCEDURE — 93005 ELECTROCARDIOGRAM TRACING: CPT

## 2017-03-12 PROCEDURE — 87086 URINE CULTURE/COLONY COUNT: CPT

## 2017-03-12 PROCEDURE — 84439 ASSAY OF FREE THYROXINE: CPT

## 2017-03-12 PROCEDURE — 84443 ASSAY THYROID STIM HORMONE: CPT

## 2017-03-12 PROCEDURE — 87177 OVA AND PARASITES SMEARS: CPT

## 2017-03-12 PROCEDURE — 80048 BASIC METABOLIC PNL TOTAL CA: CPT

## 2017-03-12 PROCEDURE — 87486 CHLMYD PNEUM DNA AMP PROBE: CPT

## 2017-03-12 PROCEDURE — 83690 ASSAY OF LIPASE: CPT

## 2017-03-31 PROBLEM — Z00.00 ENCOUNTER FOR PREVENTIVE HEALTH EXAMINATION: Status: ACTIVE | Noted: 2017-03-31

## 2017-04-03 ENCOUNTER — APPOINTMENT (OUTPATIENT)
Dept: ANTEPARTUM | Facility: CLINIC | Age: 37
End: 2017-04-03

## 2017-04-03 ENCOUNTER — ASOB RESULT (OUTPATIENT)
Age: 37
End: 2017-04-03

## 2017-04-03 ENCOUNTER — OUTPATIENT (OUTPATIENT)
Dept: OUTPATIENT SERVICES | Facility: HOSPITAL | Age: 37
LOS: 1 days | End: 2017-04-03
Payer: COMMERCIAL

## 2017-04-03 DIAGNOSIS — Z01.818 ENCOUNTER FOR OTHER PREPROCEDURAL EXAMINATION: ICD-10-CM

## 2017-04-03 LAB
APPEARANCE UR: ABNORMAL
BASOPHILS # BLD AUTO: 0 K/UL — SIGNIFICANT CHANGE UP (ref 0–0.2)
BASOPHILS NFR BLD AUTO: 0.2 % — SIGNIFICANT CHANGE UP (ref 0–2)
BILIRUB UR-MCNC: NEGATIVE — SIGNIFICANT CHANGE UP
BLD GP AB SCN SERPL QL: SIGNIFICANT CHANGE UP
COLOR SPEC: YELLOW — SIGNIFICANT CHANGE UP
COMMENT - BLOOD BANK: SIGNIFICANT CHANGE UP
COMMENT - URINE: SIGNIFICANT CHANGE UP
DIFF PNL FLD: ABNORMAL
EOSINOPHIL # BLD AUTO: 0 K/UL — SIGNIFICANT CHANGE UP (ref 0–0.5)
EOSINOPHIL NFR BLD AUTO: 0.5 % — SIGNIFICANT CHANGE UP (ref 0–6)
EPI CELLS # UR: ABNORMAL
GLUCOSE UR QL: NEGATIVE MG/DL — SIGNIFICANT CHANGE UP
HCT VFR BLD CALC: 34.4 % — LOW (ref 37–47)
HGB BLD-MCNC: 11.6 G/DL — LOW (ref 12–16)
KETONES UR-MCNC: NEGATIVE — SIGNIFICANT CHANGE UP
LEUKOCYTE ESTERASE UR-ACNC: ABNORMAL
LYMPHOCYTES # BLD AUTO: 1.6 K/UL — SIGNIFICANT CHANGE UP (ref 1–4.8)
LYMPHOCYTES # BLD AUTO: 27.3 % — SIGNIFICANT CHANGE UP (ref 20–55)
MCHC RBC-ENTMCNC: 28.6 PG — SIGNIFICANT CHANGE UP (ref 27–31)
MCHC RBC-ENTMCNC: 33.7 G/DL — SIGNIFICANT CHANGE UP (ref 32–36)
MCV RBC AUTO: 84.9 FL — SIGNIFICANT CHANGE UP (ref 81–99)
MONOCYTES # BLD AUTO: 0.5 K/UL — SIGNIFICANT CHANGE UP (ref 0–0.8)
MONOCYTES NFR BLD AUTO: 8.5 % — SIGNIFICANT CHANGE UP (ref 3–10)
NEUTROPHILS # BLD AUTO: 3.7 K/UL — SIGNIFICANT CHANGE UP (ref 1.8–8)
NEUTROPHILS NFR BLD AUTO: 63 % — SIGNIFICANT CHANGE UP (ref 37–73)
NITRITE UR-MCNC: NEGATIVE — SIGNIFICANT CHANGE UP
PH UR: 6.5 — SIGNIFICANT CHANGE UP (ref 4.8–8)
PLATELET # BLD AUTO: 235 K/UL — SIGNIFICANT CHANGE UP (ref 150–400)
PROT UR-MCNC: NEGATIVE MG/DL — SIGNIFICANT CHANGE UP
RBC # BLD: 4.05 M/UL — LOW (ref 4.4–5.2)
RBC # FLD: 15.8 % — HIGH (ref 11–15.6)
RBC CASTS # UR COMP ASSIST: SIGNIFICANT CHANGE UP /HPF (ref 0–4)
SP GR SPEC: 1.02 — SIGNIFICANT CHANGE UP (ref 1.01–1.02)
TYPE + AB SCN PNL BLD: SIGNIFICANT CHANGE UP
UROBILINOGEN FLD QL: NEGATIVE MG/DL — SIGNIFICANT CHANGE UP
WBC # BLD: 5.9 K/UL — SIGNIFICANT CHANGE UP (ref 4.8–10.8)
WBC # FLD AUTO: 5.9 K/UL — SIGNIFICANT CHANGE UP (ref 4.8–10.8)
WBC UR QL: SIGNIFICANT CHANGE UP

## 2017-04-03 PROCEDURE — 86901 BLOOD TYPING SEROLOGIC RH(D): CPT

## 2017-04-03 PROCEDURE — 86900 BLOOD TYPING SEROLOGIC ABO: CPT

## 2017-04-03 PROCEDURE — 81001 URINALYSIS AUTO W/SCOPE: CPT

## 2017-04-03 PROCEDURE — 85027 COMPLETE CBC AUTOMATED: CPT

## 2017-04-03 PROCEDURE — 86850 RBC ANTIBODY SCREEN: CPT

## 2017-04-04 DIAGNOSIS — Z01.818 ENCOUNTER FOR OTHER PREPROCEDURAL EXAMINATION: ICD-10-CM

## 2017-04-04 DIAGNOSIS — O34.219 MATERNAL CARE FOR UNSPECIFIED TYPE SCAR FROM PREVIOUS CESAREAN DELIVERY: ICD-10-CM

## 2017-04-11 ENCOUNTER — RESULT REVIEW (OUTPATIENT)
Age: 37
End: 2017-04-11

## 2017-04-12 ENCOUNTER — INPATIENT (INPATIENT)
Facility: HOSPITAL | Age: 37
LOS: 2 days | Discharge: HOME | End: 2017-04-15
Attending: OBSTETRICS & GYNECOLOGY | Admitting: OBSTETRICS & GYNECOLOGY
Payer: COMMERCIAL

## 2017-04-12 VITALS — WEIGHT: 182.98 LBS

## 2017-04-12 DIAGNOSIS — O34.219 MATERNAL CARE FOR UNSPECIFIED TYPE SCAR FROM PREVIOUS CESAREAN DELIVERY: ICD-10-CM

## 2017-04-12 LAB
BASE EXCESS BLDCOA CALC-SCNC: -3.5 MMOL/L — SIGNIFICANT CHANGE UP (ref -11.6–0.4)
BASE EXCESS BLDCOA CALC-SCNC: -3.8 MMOL/L — SIGNIFICANT CHANGE UP (ref -11.6–0.4)
BASE EXCESS BLDCOV CALC-SCNC: -2.1 MMOL/L — SIGNIFICANT CHANGE UP (ref -9.3–0.3)
BASE EXCESS BLDCOV CALC-SCNC: -2.7 MMOL/L — SIGNIFICANT CHANGE UP (ref -9.3–0.3)
BLD GP AB SCN SERPL QL: SIGNIFICANT CHANGE UP
GAS PNL BLDCOV: 7.32 — SIGNIFICANT CHANGE UP (ref 7.11–7.36)
GAS PNL BLDCOV: 7.33 — SIGNIFICANT CHANGE UP (ref 7.11–7.36)
HCO3 BLDCOA-SCNC: 24 MMOL/L — SIGNIFICANT CHANGE UP (ref 15–27)
HCO3 BLDCOA-SCNC: 24 MMOL/L — SIGNIFICANT CHANGE UP (ref 15–27)
HCO3 BLDCOV-SCNC: 23 MMOL/L — SIGNIFICANT CHANGE UP (ref 17–25)
HCO3 BLDCOV-SCNC: 24 MMOL/L — SIGNIFICANT CHANGE UP (ref 17–25)
PCO2 BLDCOA: 56.1 MMHG — SIGNIFICANT CHANGE UP (ref 32.2–65.8)
PCO2 BLDCOA: 57.1 MMHG — SIGNIFICANT CHANGE UP (ref 32.2–65.8)
PCO2 BLDCOV: 44.2 MMHG — SIGNIFICANT CHANGE UP (ref 27–49.4)
PCO2 BLDCOV: 46.8 MMHG — SIGNIFICANT CHANGE UP (ref 27–49.4)
PH BLDCOA: 7.25 — SIGNIFICANT CHANGE UP (ref 7.11–7.36)
PH BLDCOA: 7.25 — SIGNIFICANT CHANGE UP (ref 7.11–7.36)
PO2 BLDCOA: 18 MMHG — SIGNIFICANT CHANGE UP (ref 6–30)
PO2 BLDCOA: 18.4 MMHG — SIGNIFICANT CHANGE UP (ref 17.4–41)
PO2 BLDCOA: 19 MMHG — SIGNIFICANT CHANGE UP (ref 6–30)
PO2 BLDCOA: 19.3 MMHG — SIGNIFICANT CHANGE UP (ref 17.4–41)
RPR SERPL-ACNC: SIGNIFICANT CHANGE UP
SAO2 % BLDCOA: SIGNIFICANT CHANGE UP
SAO2 % BLDCOA: SIGNIFICANT CHANGE UP
SAO2 % BLDCOV: SIGNIFICANT CHANGE UP
SAO2 % BLDCOV: SIGNIFICANT CHANGE UP

## 2017-04-12 PROCEDURE — 88307 TISSUE EXAM BY PATHOLOGIST: CPT | Mod: 26

## 2017-04-12 RX ORDER — CITRIC ACID/SODIUM CITRATE 300-500 MG
30 SOLUTION, ORAL ORAL ONCE
Qty: 0 | Refills: 0 | Status: COMPLETED | OUTPATIENT
Start: 2017-04-12 | End: 2017-04-12

## 2017-04-12 RX ORDER — OXYCODONE HYDROCHLORIDE 5 MG/1
5 TABLET ORAL
Qty: 0 | Refills: 0 | Status: DISCONTINUED | OUTPATIENT
Start: 2017-04-12 | End: 2017-04-15

## 2017-04-12 RX ORDER — ONDANSETRON 8 MG/1
4 TABLET, FILM COATED ORAL EVERY 6 HOURS
Qty: 0 | Refills: 0 | Status: DISCONTINUED | OUTPATIENT
Start: 2017-04-12 | End: 2017-04-15

## 2017-04-12 RX ORDER — ACETAMINOPHEN 500 MG
650 TABLET ORAL EVERY 6 HOURS
Qty: 0 | Refills: 0 | Status: DISCONTINUED | OUTPATIENT
Start: 2017-04-12 | End: 2017-04-15

## 2017-04-12 RX ORDER — IBUPROFEN 200 MG
600 TABLET ORAL EVERY 6 HOURS
Qty: 0 | Refills: 0 | Status: DISCONTINUED | OUTPATIENT
Start: 2017-04-12 | End: 2017-04-15

## 2017-04-12 RX ORDER — SODIUM CHLORIDE 9 MG/ML
1000 INJECTION, SOLUTION INTRAVENOUS ONCE
Qty: 0 | Refills: 0 | Status: COMPLETED | OUTPATIENT
Start: 2017-04-12 | End: 2017-04-12

## 2017-04-12 RX ORDER — OXYTOCIN 10 UNIT/ML
333.33 VIAL (ML) INJECTION
Qty: 20 | Refills: 0 | Status: DISCONTINUED | OUTPATIENT
Start: 2017-04-12 | End: 2017-04-15

## 2017-04-12 RX ORDER — ONDANSETRON 8 MG/1
4 TABLET, FILM COATED ORAL ONCE
Qty: 0 | Refills: 0 | Status: DISCONTINUED | OUTPATIENT
Start: 2017-04-12 | End: 2017-04-12

## 2017-04-12 RX ORDER — KETOROLAC TROMETHAMINE 30 MG/ML
30 SYRINGE (ML) INJECTION EVERY 6 HOURS
Qty: 0 | Refills: 0 | Status: DISCONTINUED | OUTPATIENT
Start: 2017-04-12 | End: 2017-04-13

## 2017-04-12 RX ORDER — KETOROLAC TROMETHAMINE 30 MG/ML
30 SYRINGE (ML) INJECTION ONCE
Qty: 0 | Refills: 0 | Status: DISCONTINUED | OUTPATIENT
Start: 2017-04-12 | End: 2017-04-12

## 2017-04-12 RX ORDER — DIPHENHYDRAMINE HCL 50 MG
25 CAPSULE ORAL EVERY 6 HOURS
Qty: 0 | Refills: 0 | Status: DISCONTINUED | OUTPATIENT
Start: 2017-04-12 | End: 2017-04-15

## 2017-04-12 RX ORDER — OXYTOCIN 10 UNIT/ML
41.67 VIAL (ML) INJECTION
Qty: 20 | Refills: 0 | Status: DISCONTINUED | OUTPATIENT
Start: 2017-04-12 | End: 2017-04-15

## 2017-04-12 RX ORDER — TETANUS TOXOID, REDUCED DIPHTHERIA TOXOID AND ACELLULAR PERTUSSIS VACCINE, ADSORBED 5; 2.5; 8; 8; 2.5 [IU]/.5ML; [IU]/.5ML; UG/.5ML; UG/.5ML; UG/.5ML
0.5 SUSPENSION INTRAMUSCULAR ONCE
Qty: 0 | Refills: 0 | Status: COMPLETED | OUTPATIENT
Start: 2017-04-12

## 2017-04-12 RX ORDER — DOCUSATE SODIUM 100 MG
100 CAPSULE ORAL
Qty: 0 | Refills: 0 | Status: DISCONTINUED | OUTPATIENT
Start: 2017-04-12 | End: 2017-04-15

## 2017-04-12 RX ORDER — METOCLOPRAMIDE HCL 10 MG
10 TABLET ORAL ONCE
Qty: 0 | Refills: 0 | Status: DISCONTINUED | OUTPATIENT
Start: 2017-04-12 | End: 2017-04-12

## 2017-04-12 RX ORDER — NALOXONE HYDROCHLORIDE 4 MG/.1ML
0.1 SPRAY NASAL
Qty: 0 | Refills: 0 | Status: DISCONTINUED | OUTPATIENT
Start: 2017-04-12 | End: 2017-04-15

## 2017-04-12 RX ORDER — SIMETHICONE 80 MG/1
80 TABLET, CHEWABLE ORAL EVERY 4 HOURS
Qty: 0 | Refills: 0 | Status: DISCONTINUED | OUTPATIENT
Start: 2017-04-12 | End: 2017-04-15

## 2017-04-12 RX ORDER — FERROUS SULFATE 325(65) MG
325 TABLET ORAL DAILY
Qty: 0 | Refills: 0 | Status: DISCONTINUED | OUTPATIENT
Start: 2017-04-12 | End: 2017-04-15

## 2017-04-12 RX ORDER — DIPHENHYDRAMINE HCL 50 MG
25 CAPSULE ORAL EVERY 4 HOURS
Qty: 0 | Refills: 0 | Status: DISCONTINUED | OUTPATIENT
Start: 2017-04-12 | End: 2017-04-15

## 2017-04-12 RX ORDER — LANOLIN
1 OINTMENT (GRAM) TOPICAL
Qty: 0 | Refills: 0 | Status: DISCONTINUED | OUTPATIENT
Start: 2017-04-12 | End: 2017-04-15

## 2017-04-12 RX ORDER — SODIUM CHLORIDE 9 MG/ML
1000 INJECTION, SOLUTION INTRAVENOUS
Qty: 0 | Refills: 0 | Status: DISCONTINUED | OUTPATIENT
Start: 2017-04-12 | End: 2017-04-12

## 2017-04-12 RX ORDER — CEFAZOLIN SODIUM 1 G
2000 VIAL (EA) INJECTION ONCE
Qty: 0 | Refills: 0 | Status: COMPLETED | OUTPATIENT
Start: 2017-04-12 | End: 2017-04-12

## 2017-04-12 RX ORDER — SODIUM CHLORIDE 9 MG/ML
1000 INJECTION, SOLUTION INTRAVENOUS
Qty: 0 | Refills: 0 | Status: DISCONTINUED | OUTPATIENT
Start: 2017-04-12 | End: 2017-04-15

## 2017-04-12 RX ORDER — OXYCODONE HYDROCHLORIDE 5 MG/1
10 TABLET ORAL
Qty: 0 | Refills: 0 | Status: DISCONTINUED | OUTPATIENT
Start: 2017-04-12 | End: 2017-04-15

## 2017-04-12 RX ORDER — GLYCERIN ADULT
1 SUPPOSITORY, RECTAL RECTAL AT BEDTIME
Qty: 0 | Refills: 0 | Status: DISCONTINUED | OUTPATIENT
Start: 2017-04-12 | End: 2017-04-15

## 2017-04-12 RX ADMIN — SODIUM CHLORIDE 125 MILLILITER(S): 9 INJECTION, SOLUTION INTRAVENOUS at 20:08

## 2017-04-12 RX ADMIN — Medication 30 MILLIGRAM(S): at 17:18

## 2017-04-12 RX ADMIN — Medication 1000 MILLIUNIT(S)/MIN: at 08:23

## 2017-04-12 RX ADMIN — Medication 30 MILLIGRAM(S): at 11:10

## 2017-04-12 RX ADMIN — Medication 30 MILLILITER(S): at 07:31

## 2017-04-12 RX ADMIN — Medication 30 MILLIGRAM(S): at 17:03

## 2017-04-12 RX ADMIN — Medication 30 MILLIGRAM(S): at 10:52

## 2017-04-12 RX ADMIN — Medication 100 MILLIGRAM(S): at 08:05

## 2017-04-12 RX ADMIN — SODIUM CHLORIDE 2000 MILLILITER(S): 9 INJECTION, SOLUTION INTRAVENOUS at 07:20

## 2017-04-13 ENCOUNTER — TRANSCRIPTION ENCOUNTER (OUTPATIENT)
Age: 37
End: 2017-04-13

## 2017-04-13 LAB
HCT VFR BLD CALC: 30.8 % — LOW (ref 37–47)
HGB BLD-MCNC: 10.2 G/DL — LOW (ref 12–16)
MCHC RBC-ENTMCNC: 28.9 PG — SIGNIFICANT CHANGE UP (ref 27–31)
MCHC RBC-ENTMCNC: 33.1 G/DL — SIGNIFICANT CHANGE UP (ref 32–36)
MCV RBC AUTO: 87.3 FL — SIGNIFICANT CHANGE UP (ref 81–99)
PLATELET # BLD AUTO: 210 K/UL — SIGNIFICANT CHANGE UP (ref 150–400)
RBC # BLD: 3.53 M/UL — LOW (ref 4.4–5.2)
RBC # FLD: 16.6 % — HIGH (ref 11–15.6)
WBC # BLD: 10.4 K/UL — SIGNIFICANT CHANGE UP (ref 4.8–10.8)
WBC # FLD AUTO: 10.4 K/UL — SIGNIFICANT CHANGE UP (ref 4.8–10.8)

## 2017-04-13 RX ADMIN — Medication 600 MILLIGRAM(S): at 19:44

## 2017-04-13 RX ADMIN — Medication 325 MILLIGRAM(S): at 13:28

## 2017-04-13 RX ADMIN — Medication 1 TABLET(S): at 13:29

## 2017-04-13 RX ADMIN — Medication 600 MILLIGRAM(S): at 18:19

## 2017-04-13 RX ADMIN — Medication 30 MILLIGRAM(S): at 07:53

## 2017-04-13 RX ADMIN — Medication 30 MILLIGRAM(S): at 08:10

## 2017-04-13 RX ADMIN — SIMETHICONE 80 MILLIGRAM(S): 80 TABLET, CHEWABLE ORAL at 13:29

## 2017-04-13 RX ADMIN — Medication 30 MILLIGRAM(S): at 01:01

## 2017-04-13 NOTE — DISCHARGE NOTE OB - HOSPITAL COURSE
This is a 36 yo  who experienced primary  delivery at 38 weeks due to di/di twin gestation. Labor course was uncomplicated, delivery was uncomplicated. Postpartum course was unremarkable. Patient was transferred to postpartum floor and monitored. Patient is medically optimized for discharge, instructed to follow up in 5 days at [facility] for wound care, and to follow up with [facility] in 6 weeks for postpartum care.

## 2017-04-13 NOTE — DISCHARGE NOTE OB - MATERIALS PROVIDED
Breastfeeding Guide and Packet/Breastfeeding Mother’s Support Group Information/Back To Sleep Handout/Vaccinations/Shaken Baby Prevention Handout/Tdap Vaccination (VIS Pub Date: 2012)/Guide to Postpartum Care/[facility] [place] Screening Program/Breastfeeding Log/[facility] Hearing Screen Program/Birth Certificate Instructions/  Immunization Record

## 2017-04-13 NOTE — DISCHARGE NOTE OB - CARE PROVIDER_API CALL
[facility], OBBRIENN  [address], [place] [address]  Ph: [phone]  Phone: (   )    -  Fax: (       -

## 2017-04-13 NOTE — DISCHARGE NOTE OB - MEDICATION SUMMARY - MEDICATIONS TO STOP TAKING
I will STOP taking the medications listed below when I get home from the hospital:    metoclopramide 10 mg oral tablet  -- 1 tab(s) by mouth 4 times a day (before meals and at bedtime)    famotidine 20 mg oral tablet  -- 1 tab(s) by mouth 2 times a day    ondansetron 4 mg oral tablet, disintegrating  -- 1 tab(s) by mouth 3 times a day, As Needed nasuea or vomiting    Zantac 150 oral tablet  -- 1 tab(s) by mouth once a day (at bedtime)  -- It is very important that you take or use this exactly as directed.  Do not skip doses or discontinue unless directed by your doctor.  Obtain medical advice before taking any non-prescription drugs as some may affect the action of this medication.    ondansetron 4 mg oral tablet  -- 1 tab(s) by mouth once a day

## 2017-04-13 NOTE — DISCHARGE NOTE OB - CARE PLAN
Principal Discharge DX:	 delivery delivered  Goal:	Delivered  Instructions for follow-up, activity and diet:	Regular diet, take meds as directed, activity as tolerated. Follow up in 5 days at [facility] for wound care, and to follow up with [facility] in 6 weeks for postpartum care. Principal Discharge DX:	 delivery delivered  Goal:	Delivered  Instructions for follow-up, activity and diet:	Regular diet, take meds as directed, activity as tolerated. Follow up in 5 days at [facility] for wound care, and to follow up with [facility] in 6 weeks for postpartum care. Principal Discharge DX:	 delivery delivered  Goal:	Delivered  Instructions for follow-up, activity and diet:	Regular diet, take meds as directed, activity as tolerated. Follow up in 5 days at [facility] for wound care, and to follow up with [facility] in 6 weeks for postpartum care. Principal Discharge DX:	 delivery delivered  Goal:	Delivered  Instructions for follow-up, activity and diet:	Regular diet, take meds as directed, activity as tolerated. Follow up in 5 days at [facility] for wound care, and to follow up with [facility] in 6 weeks for postpartum care.

## 2017-04-13 NOTE — PROGRESS NOTE ADULT - SUBJECTIVE AND OBJECTIVE BOX
Postpartum Note,  Section  She is a  37y  s/p primary C/S for di di twin gestation, who is now post-operative day: 1    Subjective:  The patient feels well.  She is ambulating.   She is tolerating regular diet.  She denies nausea and vomiting.  She is voiding.  Her pain is controlled.  She reports normal postpartum bleeding.  She is breastfeeding.  She is formula feeding.    Vital Signs Last 24 Hrs  T(C): 36.8, Max: 36.8 (04-12 @ 16:06)  T(F): 98.2, Max: 98.3 (04-12 @ 21:20)  HR: 73 (59 - 78)  BP: 90/53 (90/53 - 123/70)  BP(mean): --  RR: 18 (14 - 21)  SpO2: 97% (96% - 99%)    Gen: NAD  Breast: Soft, nontender, not engorged.  Abdomen: Soft, nontender, no distension , firm uterine fundus at umbilicus.  Incision: Clean, dry, and intact with steri strips, no s/s of infection or dehescence  Pelvic: Minimal lochia noted  Ext: No calf tenderness or edema  CNS: grossly non focal  Psych: appropriate mood & affect      Daily     Daily Weight Pre-pregnancy in k (2017 07:34)  I&O's Detail    I & Os for current day (as of 2017 06:57)  =============================================  IN:    lactated ringers.: 1375 ml    oxytocin Infusion: 125 ml    Total IN: 1500 ml  ---------------------------------------------  OUT:    Indwelling Catheter - Urethral: 3350 ml    Estimated Blood Loss: 750 ml    Total OUT: 4100 ml  ---------------------------------------------  Total NET: -2600 ml    Last Menstrual Period  2016 (2017 07:34)      MEDICATIONS  (STANDING):  lactated ringers. 1000milliLiter(s) IV Continuous <Continuous>  diphtheria/tetanus/pertussis (acellular) Vaccine (ADAcel) 0.5milliLiter(s) IntraMuscular once  oxytocin Infusion 41.667milliUNIT(s)/Min IV Continuous <Continuous>  ferrous    sulfate 325milliGRAM(s) Oral daily  prenatal multivitamin 1Tablet(s) Oral daily  oxytocin Infusion 333.333milliUNIT(s)/Min IV Continuous <Continuous>    MEDICATIONS  (PRN):  acetaminophen   Tablet 650milliGRAM(s) Oral every 6 hours PRN For Temp greater than 38.5 C (101.3 F)  ibuprofen  Tablet 600milliGRAM(s) Oral every 6 hours PRN Mild pain or headache  oxyCODONE  5 mG/acetaminophen 325 mG 1Tablet(s) Oral every 3 hours PRN Moderate Pain  oxyCODONE  5 mG/acetaminophen 325 mG 2Tablet(s) Oral every 6 hours PRN Severe Pain  simethicone 80milliGRAM(s) Chew every 4 hours PRN Gas  diphenhydrAMINE   Capsule 25milliGRAM(s) Oral every 6 hours PRN Itching  glycerin Suppository - Adult 1Suppository(s) Rectal at bedtime PRN Constipation  docusate sodium 100milliGRAM(s) Oral two times a day PRN Stool Softening  lanolin Ointment 1Application(s) Topical every 3 hours PRN Sore Nipples  oxyCODONE IR 5milliGRAM(s) Oral every 3 hours PRN Mild Pain  oxyCODONE IR 10milliGRAM(s) Oral every 3 hours PRN Moderate Pain  naloxone Injectable 0.1milliGRAM(s) IV Push every 3 minutes PRN For ANY of the following changes in patient status:  A. RR LESS THAN 10 breaths per minute, B. Oxygen saturation LESS THAN 90%, C. Sedation score of 6  diphenhydrAMINE   Capsule 25milliGRAM(s) Oral every 4 hours PRN Pruritus  ondansetron Injectable 4milliGRAM(s) IV Push every 6 hours PRN Nausea and/or Vomiting  ketorolac   Injectable 30milliGRAM(s) IV Push every 6 hours PRN Moderate Pain (4 - 6)

## 2017-04-13 NOTE — DISCHARGE NOTE OB - ADDITIONAL INSTRUCTIONS
Regular diet, take meds as directed, activity as tolerated. Follow up in 5 days at [facility] for wound care, and to follow up with [facility] in 6 weeks for postpartum care.

## 2017-04-13 NOTE — DISCHARGE NOTE OB - PLAN OF CARE
Delivered Regular diet, take meds as directed, activity as tolerated. Follow up in 5 days at [facility] for wound care, and to follow up with [facility] in 6 weeks for postpartum care.

## 2017-04-13 NOTE — DISCHARGE NOTE OB - PATIENT PORTAL LINK FT
“You can access the Follow[employer] Patient Portal, offered by [facility], by registering with the following website: [email or web address]”

## 2017-04-13 NOTE — PROGRESS NOTE ADULT - SUBJECTIVE AND OBJECTIVE BOX
S/P  with spinal anesthesia POD#1   No complaints, VSS, MAEx4, ambulating.   Denies any PONV, headache or paresthesia.   No numbness or weakness noted.  Pain management satisfactory.   No apparent anesthesia complications noted.

## 2017-04-13 NOTE — DISCHARGE NOTE OB - PROVIDER TOKENS
FREE:[LAST:[[facility]],FIRST:[OBGYN],PHONE:[(   )    -],FAX:[(   )    -],ADDRESS:[[address], [place] [address]  Ph: [phone]]]

## 2017-04-14 RX ORDER — IBUPROFEN 200 MG
1 TABLET ORAL
Qty: 30 | Refills: 0 | OUTPATIENT
Start: 2017-04-14

## 2017-04-14 RX ADMIN — Medication 600 MILLIGRAM(S): at 17:10

## 2017-04-14 RX ADMIN — Medication 600 MILLIGRAM(S): at 18:08

## 2017-04-14 RX ADMIN — Medication 600 MILLIGRAM(S): at 10:35

## 2017-04-14 RX ADMIN — Medication 1 TABLET(S): at 12:56

## 2017-04-14 RX ADMIN — Medication 325 MILLIGRAM(S): at 12:56

## 2017-04-14 RX ADMIN — SIMETHICONE 80 MILLIGRAM(S): 80 TABLET, CHEWABLE ORAL at 12:56

## 2017-04-14 RX ADMIN — Medication 100 MILLIGRAM(S): at 12:56

## 2017-04-14 RX ADMIN — Medication 600 MILLIGRAM(S): at 09:06

## 2017-04-14 NOTE — PROGRESS NOTE ADULT - ATTENDING COMMENTS
Patient is POD#2  dressing removed and incision intact with steri strips  patient encouraged to ambulate  breasts not engorged  anticipate discharge home in the am

## 2017-04-14 NOTE — PROGRESS NOTE ADULT - SUBJECTIVE AND OBJECTIVE BOX
Postpartum Note,  Section  She is a  37y  s/p primary C/S for di di twin gestation, who is now post-operative day: 2    Subjective:  The patient feels well.  She is ambulating.   She is tolerating regular diet.  She denies nausea and vomiting.  She is voiding.  Her pain is controlled.  She reports normal postpartum bleeding.  She is breastfeeding.  She is formula feeding.    Vital Signs Last 24 Hrs  T(C): 36.6, Max: 36.7 (-13 @ 12:00)  T(F): 97.9, Max: 98.1 (04-13 @ 12:00)  HR: 80 (74 - 80)  BP: 106/65 (100/66 - 110/67)  BP(mean): --  RR: 20 (18 - 20)  SpO2: --    Gen: NAD  Breast: Soft, nontender, not engorged.  Abdomen: Soft, nontender, no distension , firm uterine fundus at umbilicus.  Incision: Clean, dry, and intact with steri strips, no s/s of infection or dehescence  Pelvic: Minimal lochia noted  Ext: No calf tenderness or edema  CNS: grossly non focal  Psych: appropriate mood & affect      Daily     Daily   I&O's Detail  I & Os for 24h ending 2017 07:00  =============================================  IN:    lactated ringers.: 1375 ml    oxytocin Infusion: 125 ml    Total IN: 1500 ml  ---------------------------------------------  OUT:    Indwelling Catheter - Urethral: 3350 ml    Estimated Blood Loss: 750 ml    Total OUT: 4100 ml  ---------------------------------------------  Total NET: -2600 ml    I & Os for current day (as of 2017 06:26)  =============================================  IN:    lactated ringers.: 375 ml    Total IN: 375 ml  ---------------------------------------------  OUT:    Indwelling Catheter - Urethral: 1050 ml    Total OUT: 1050 ml  ---------------------------------------------  Total NET: -675 ml    Last Menstrual Period  2016 (2017 07:34)        LABS:                        10.2   10.4  )-----------( 210      ( 2017 21:37 )             30.8     CBC Full  -  ( 2017 21:37 )  WBC Count : 10.4 K/uL  Hemoglobin : 10.2 g/dL  Hematocrit : 30.8 %  Platelet Count - Automated : 210 K/uL  Mean Cell Volume : 87.3 fl  Mean Cell Hemoglobin : 28.9 pg  Mean Cell Hemoglobin Concentration : 33.1 g/dL

## 2017-04-15 VITALS — SYSTOLIC BLOOD PRESSURE: 114 MMHG | DIASTOLIC BLOOD PRESSURE: 74 MMHG | TEMPERATURE: 97 F | HEART RATE: 78 BPM

## 2017-04-15 RX ORDER — SENNA PLUS 8.6 MG/1
1 TABLET ORAL ONCE
Qty: 0 | Refills: 0 | Status: COMPLETED | OUTPATIENT
Start: 2017-04-15 | End: 2017-04-15

## 2017-04-15 RX ORDER — DOCUSATE SODIUM 100 MG
100 CAPSULE ORAL ONCE
Qty: 0 | Refills: 0 | Status: COMPLETED | OUTPATIENT
Start: 2017-04-15 | End: 2017-04-15

## 2017-04-15 RX ORDER — TETANUS TOXOID, REDUCED DIPHTHERIA TOXOID AND ACELLULAR PERTUSSIS VACCINE, ADSORBED 5; 2.5; 8; 8; 2.5 [IU]/.5ML; [IU]/.5ML; UG/.5ML; UG/.5ML; UG/.5ML
0.5 SUSPENSION INTRAMUSCULAR ONCE
Qty: 0 | Refills: 0 | Status: COMPLETED | OUTPATIENT
Start: 2017-04-15 | End: 2017-04-15

## 2017-04-15 RX ADMIN — TETANUS TOXOID, REDUCED DIPHTHERIA TOXOID AND ACELLULAR PERTUSSIS VACCINE, ADSORBED 0.5 MILLILITER(S): 5; 2.5; 8; 8; 2.5 SUSPENSION INTRAMUSCULAR at 05:07

## 2017-04-15 RX ADMIN — Medication 650 MILLIGRAM(S): at 08:15

## 2017-04-15 RX ADMIN — Medication 1 TABLET(S): at 11:49

## 2017-04-15 RX ADMIN — Medication 600 MILLIGRAM(S): at 15:36

## 2017-04-15 RX ADMIN — Medication 100 MILLIGRAM(S): at 11:50

## 2017-04-15 RX ADMIN — Medication 325 MILLIGRAM(S): at 11:49

## 2017-04-15 RX ADMIN — SENNA PLUS 1 TABLET(S): 8.6 TABLET ORAL at 11:50

## 2017-04-15 NOTE — PROGRESS NOTE ADULT - SUBJECTIVE AND OBJECTIVE BOX
Postpartum Note,  Section  She is a  37y  s/p primary C/S for di di twin gestation, who is now post-operative day: 3    Subjective:  Patient seen & examined at bedside in No acute distress.  No acute events overnight.  The patient feels well and denies any new complaints.  She is ambulating without difficulty.   She is tolerating regular diet.  She denies nausea and vomiting.  She is voiding.  Her pain is controlled.  She reports normal postpartum bleeding.  She is breastfeeding.  She is formula feeding.    Vital Signs Last 24 Hrs  T(C): 36.9, Max: 37.1 (-14 @ 09:13)  T(F): 98.4, Max: 98.8 (- @ 09:13)  HR: 83 (78 - 83)  BP: 114/65 (114/65 - 120/76)  BP(mean): --  RR: 14 (14 - 18)  SpO2: 98% (98% - 98%)    Gen: NAD, WD/WN  Neck: supple, no JVD; no lymphadenopathy  Breast: Soft, nontender, not engorged.  Abdomen: Soft, nontender, no distension , firm uterine fundus at umbilicus.  Incision: Clean, dry, and intact with steri strips, no s/s of infection or dehescence  Pelvic: Minimal lochia noted  Ext: No calf tenderness or edema  CNS: grossly non focal  Psych: appropriate mood & affect      Daily     Daily   I&O's Detail  I & Os for 24h ending 2017 07:00  =============================================  IN:    lactated ringers.: 1375 ml    oxytocin Infusion: 125 ml    Total IN: 1500 ml  ---------------------------------------------  OUT:    Indwelling Catheter - Urethral: 3350 ml    Estimated Blood Loss: 750 ml    Total OUT: 4100 ml  ---------------------------------------------  Total NET: -2600 ml    I & Os for current day (as of 2017 06:26)  =============================================  IN:    lactated ringers.: 375 ml    Total IN: 375 ml  ---------------------------------------------  OUT:    Indwelling Catheter - Urethral: 1050 ml    Total OUT: 1050 ml  ---------------------------------------------  Total NET: -675 ml    Last Menstrual Period  2016 (2017 07:34)        LABS:                        10.2   10.4  )-----------( 210      ( 2017 21:37 )             30.8     CBC Full  -  ( 2017 21:37 )  WBC Count : 10.4 K/uL  Hemoglobin : 10.2 g/dL  Hematocrit : 30.8 %  Platelet Count - Automated : 210 K/uL  Mean Cell Volume : 87.3 fl  Mean Cell Hemoglobin : 28.9 pg  Mean Cell Hemoglobin Concentration : 33.1 g/dL

## 2017-04-15 NOTE — PROGRESS NOTE ADULT - ATTENDING COMMENTS
POD#3   doing well  meeting milestones  abdominal binder  d/c home today  f/u in the office in one week for incision check

## 2017-04-15 NOTE — PROGRESS NOTE ADULT - PROBLEM SELECTOR PLAN 1
Continue current level of care, routine post partum  Encourage ambulation  Encourage mom baby interaction, breast feed ad amaya on demand  pain control  constipation ppx  anticipate D/C on POD #3
Continue current level of care, routine post partum  Encourage ambulation  Encourage mom baby interaction, breast feed ad amaya on demand  pain control  constipation ppx  anticipate D/C today on POD #3
Continue current level of care, routine post partum  Encourage ambulation  Encourage mom baby interaction, breast feed ad amaya on demand  pain control  constipation ppx  anticipate D/C on POD #3

## 2017-04-25 ENCOUNTER — EMERGENCY (EMERGENCY)
Facility: HOSPITAL | Age: 37
LOS: 1 days | Discharge: HOME | End: 2017-04-25
Attending: EMERGENCY MEDICINE
Payer: COMMERCIAL

## 2017-04-25 VITALS
WEIGHT: 166.01 LBS | RESPIRATION RATE: 16 BRPM | TEMPERATURE: 98 F | HEART RATE: 63 BPM | HEIGHT: 65 IN | DIASTOLIC BLOOD PRESSURE: 80 MMHG | OXYGEN SATURATION: 99 % | SYSTOLIC BLOOD PRESSURE: 132 MMHG

## 2017-04-25 DIAGNOSIS — M79.662 PAIN IN LEFT LOWER LEG: ICD-10-CM

## 2017-04-25 DIAGNOSIS — M79.602 PAIN IN LEFT ARM: ICD-10-CM

## 2017-04-25 PROCEDURE — 99284 EMERGENCY DEPT VISIT MOD MDM: CPT

## 2017-04-25 PROCEDURE — T1013: CPT

## 2017-04-25 PROCEDURE — 93971 EXTREMITY STUDY: CPT

## 2017-04-25 PROCEDURE — 93971 EXTREMITY STUDY: CPT | Mod: 26,RT

## 2017-04-25 PROCEDURE — 99284 EMERGENCY DEPT VISIT MOD MDM: CPT | Mod: 25

## 2017-04-25 RX ORDER — IBUPROFEN 200 MG
600 TABLET ORAL ONCE
Qty: 0 | Refills: 0 | Status: COMPLETED | OUTPATIENT
Start: 2017-04-25 | End: 2017-04-25

## 2017-04-25 RX ORDER — IBUPROFEN 200 MG
1 TABLET ORAL
Qty: 40 | Refills: 0 | OUTPATIENT
Start: 2017-04-25 | End: 2017-05-05

## 2017-04-25 RX ADMIN — Medication 600 MILLIGRAM(S): at 20:34

## 2017-04-25 NOTE — ED PROVIDER NOTE - OBJECTIVE STATEMENT
A 37 year old female pt presents to the ED c/o RLE pain. The pt states she has been experiencing RLE pain for the past two days. She denies any recent trauma to the leg or difficulty ambulating. Pt has not taken any medication for the pain and has not seen her PMD for the pain. No further complaints at this time.

## 2017-04-25 NOTE — ED ADULT NURSE NOTE - CHPI ED SYMPTOMS NEG
no chills/no numbness/no nausea/no tingling/no decreased eating/drinking/no vomiting/no weakness/no dizziness/no fever

## 2017-04-25 NOTE — ED PROVIDER NOTE - MEDICAL DECISION MAKING DETAILS
A 37 year old female pt presents to the ED c/o LLE pain. Dx: Musculoskeletal pain. Pt reports pain is better after pain medication. Will discharge pt with instructions for follow up with [facility].

## 2017-04-25 NOTE — ED ADULT NURSE NOTE - OBJECTIVE STATEMENT
pt reports rt thigh pain radiating down the leg s/p giving birth on 4/12. as per [place] [facility] here for r/o dvt. no edema to leg. a and o x3. breathing even and unlabored. pt denies cp, sob. will continue to monitor. pt denies recent trauma. pt reports rt thigh pain radiating down the leg s/p giving birth on 4/12. as per [facility] here for r/o dvt. no edema to leg. a and o x3. breathing even and unlabored. pt denies cp, sob. pt denies recent trauma.

## 2017-04-25 NOTE — ED ADULT TRIAGE NOTE - CHIEF COMPLAINT QUOTE
pt reports rt thigh pain radiating down the leg s/p giving birth on 4/12. as per [place] [facility] here for r/o dvt. no edema to leg. a and o x3. breathing even and unlabored. pt denies cp, sob. will continue to monitor. pt denies recent trauma.

## 2017-07-23 PROCEDURE — 36415 COLL VENOUS BLD VENIPUNCTURE: CPT

## 2017-07-23 PROCEDURE — 82803 BLOOD GASES ANY COMBINATION: CPT

## 2017-07-23 PROCEDURE — 59025 FETAL NON-STRESS TEST: CPT

## 2017-07-23 PROCEDURE — 86901 BLOOD TYPING SEROLOGIC RH(D): CPT

## 2017-07-23 PROCEDURE — T1013: CPT

## 2017-07-23 PROCEDURE — 86850 RBC ANTIBODY SCREEN: CPT

## 2017-07-23 PROCEDURE — 90715 TDAP VACCINE 7 YRS/> IM: CPT

## 2017-07-23 PROCEDURE — 86900 BLOOD TYPING SEROLOGIC ABO: CPT

## 2017-07-23 PROCEDURE — 88307 TISSUE EXAM BY PATHOLOGIST: CPT

## 2017-07-23 PROCEDURE — 85027 COMPLETE CBC AUTOMATED: CPT

## 2017-07-23 PROCEDURE — 86592 SYPHILIS TEST NON-TREP QUAL: CPT

## 2017-08-08 ENCOUNTER — OUTPATIENT (OUTPATIENT)
Dept: OUTPATIENT SERVICES | Facility: HOSPITAL | Age: 37
LOS: 1 days | End: 2017-08-08
Payer: COMMERCIAL

## 2017-08-08 VITALS
WEIGHT: 178.57 LBS | RESPIRATION RATE: 16 BRPM | SYSTOLIC BLOOD PRESSURE: 150 MMHG | HEIGHT: 61 IN | HEART RATE: 69 BPM | DIASTOLIC BLOOD PRESSURE: 88 MMHG | TEMPERATURE: 97 F

## 2017-08-08 DIAGNOSIS — Z30.2 ENCOUNTER FOR STERILIZATION: ICD-10-CM

## 2017-08-08 DIAGNOSIS — Z98.891 HISTORY OF UTERINE SCAR FROM PREVIOUS SURGERY: Chronic | ICD-10-CM

## 2017-08-08 DIAGNOSIS — Z01.818 ENCOUNTER FOR OTHER PREPROCEDURAL EXAMINATION: ICD-10-CM

## 2017-08-08 LAB
ANION GAP SERPL CALC-SCNC: 12 MMOL/L — SIGNIFICANT CHANGE UP (ref 5–17)
BASOPHILS # BLD AUTO: 0 K/UL — SIGNIFICANT CHANGE UP (ref 0–0.2)
BASOPHILS NFR BLD AUTO: 0.2 % — SIGNIFICANT CHANGE UP (ref 0–2)
BUN SERPL-MCNC: 13 MG/DL — SIGNIFICANT CHANGE UP (ref 8–20)
CALCIUM SERPL-MCNC: 8.9 MG/DL — SIGNIFICANT CHANGE UP (ref 8.6–10.2)
CHLORIDE SERPL-SCNC: 103 MMOL/L — SIGNIFICANT CHANGE UP (ref 98–107)
CO2 SERPL-SCNC: 25 MMOL/L — SIGNIFICANT CHANGE UP (ref 22–29)
CREAT SERPL-MCNC: 0.52 MG/DL — SIGNIFICANT CHANGE UP (ref 0.5–1.3)
EOSINOPHIL # BLD AUTO: 0.1 K/UL — SIGNIFICANT CHANGE UP (ref 0–0.5)
EOSINOPHIL NFR BLD AUTO: 1.7 % — SIGNIFICANT CHANGE UP (ref 0–6)
GLUCOSE SERPL-MCNC: 88 MG/DL — SIGNIFICANT CHANGE UP (ref 70–115)
HCT VFR BLD CALC: 38 % — SIGNIFICANT CHANGE UP (ref 37–47)
HGB BLD-MCNC: 12.7 G/DL — SIGNIFICANT CHANGE UP (ref 12–16)
LYMPHOCYTES # BLD AUTO: 2.8 K/UL — SIGNIFICANT CHANGE UP (ref 1–4.8)
LYMPHOCYTES # BLD AUTO: 44.1 % — SIGNIFICANT CHANGE UP (ref 20–55)
MCHC RBC-ENTMCNC: 27.1 PG — SIGNIFICANT CHANGE UP (ref 27–31)
MCHC RBC-ENTMCNC: 33.4 G/DL — SIGNIFICANT CHANGE UP (ref 32–36)
MCV RBC AUTO: 81.2 FL — SIGNIFICANT CHANGE UP (ref 81–99)
MONOCYTES # BLD AUTO: 0.5 K/UL — SIGNIFICANT CHANGE UP (ref 0–0.8)
MONOCYTES NFR BLD AUTO: 7.8 % — SIGNIFICANT CHANGE UP (ref 3–10)
NEUTROPHILS # BLD AUTO: 3 K/UL — SIGNIFICANT CHANGE UP (ref 1.8–8)
NEUTROPHILS NFR BLD AUTO: 46 % — SIGNIFICANT CHANGE UP (ref 37–73)
PLATELET # BLD AUTO: 331 K/UL — SIGNIFICANT CHANGE UP (ref 150–400)
POTASSIUM SERPL-MCNC: 4.3 MMOL/L — SIGNIFICANT CHANGE UP (ref 3.5–5.3)
POTASSIUM SERPL-SCNC: 4.3 MMOL/L — SIGNIFICANT CHANGE UP (ref 3.5–5.3)
RBC # BLD: 4.68 M/UL — SIGNIFICANT CHANGE UP (ref 4.4–5.2)
RBC # FLD: 13.8 % — SIGNIFICANT CHANGE UP (ref 11–15.6)
SODIUM SERPL-SCNC: 140 MMOL/L — SIGNIFICANT CHANGE UP (ref 135–145)
WBC # BLD: 6.4 K/UL — SIGNIFICANT CHANGE UP (ref 4.8–10.8)
WBC # FLD AUTO: 6.4 K/UL — SIGNIFICANT CHANGE UP (ref 4.8–10.8)

## 2017-08-08 PROCEDURE — 80048 BASIC METABOLIC PNL TOTAL CA: CPT

## 2017-08-08 PROCEDURE — G0463: CPT

## 2017-08-08 PROCEDURE — 36415 COLL VENOUS BLD VENIPUNCTURE: CPT

## 2017-08-08 PROCEDURE — 85027 COMPLETE CBC AUTOMATED: CPT

## 2017-08-08 NOTE — H&P PST ADULT - NSANTHOSAYNRD_GEN_A_CORE
No. MALCOLM screening performed.  STOP BANG Legend: 0-2 = LOW Risk; 3-4 = INTERMEDIATE Risk; 5-8 = HIGH Risk

## 2017-08-17 ENCOUNTER — OUTPATIENT (OUTPATIENT)
Dept: OUTPATIENT SERVICES | Facility: HOSPITAL | Age: 37
LOS: 1 days | End: 2017-08-17
Payer: COMMERCIAL

## 2017-08-17 VITALS
DIASTOLIC BLOOD PRESSURE: 74 MMHG | OXYGEN SATURATION: 100 % | WEIGHT: 178.57 LBS | HEIGHT: 61 IN | HEART RATE: 70 BPM | RESPIRATION RATE: 16 BRPM | TEMPERATURE: 97 F | SYSTOLIC BLOOD PRESSURE: 130 MMHG

## 2017-08-17 VITALS
SYSTOLIC BLOOD PRESSURE: 106 MMHG | HEART RATE: 73 BPM | DIASTOLIC BLOOD PRESSURE: 51 MMHG | OXYGEN SATURATION: 99 % | RESPIRATION RATE: 14 BRPM

## 2017-08-17 DIAGNOSIS — Z30.2 ENCOUNTER FOR STERILIZATION: ICD-10-CM

## 2017-08-17 DIAGNOSIS — Z98.891 HISTORY OF UTERINE SCAR FROM PREVIOUS SURGERY: Chronic | ICD-10-CM

## 2017-08-17 PROCEDURE — T1013: CPT

## 2017-08-17 PROCEDURE — 58670 LAPAROSCOPY TUBAL CAUTERY: CPT

## 2017-08-17 RX ORDER — FENTANYL CITRATE 50 UG/ML
50 INJECTION INTRAVENOUS
Qty: 0 | Refills: 0 | Status: DISCONTINUED | OUTPATIENT
Start: 2017-08-17 | End: 2017-08-17

## 2017-08-17 RX ORDER — SODIUM CHLORIDE 9 MG/ML
1000 INJECTION, SOLUTION INTRAVENOUS
Qty: 0 | Refills: 0 | Status: DISCONTINUED | OUTPATIENT
Start: 2017-08-17 | End: 2017-08-17

## 2017-08-17 RX ORDER — IBUPROFEN 200 MG
1 TABLET ORAL
Qty: 28 | Refills: 0 | OUTPATIENT
Start: 2017-08-17 | End: 2017-08-24

## 2017-08-17 RX ORDER — ONDANSETRON 8 MG/1
4 TABLET, FILM COATED ORAL ONCE
Qty: 0 | Refills: 0 | Status: DISCONTINUED | OUTPATIENT
Start: 2017-08-17 | End: 2017-08-17

## 2017-08-17 RX ORDER — SODIUM CHLORIDE 9 MG/ML
3 INJECTION INTRAMUSCULAR; INTRAVENOUS; SUBCUTANEOUS ONCE
Qty: 0 | Refills: 0 | Status: DISCONTINUED | OUTPATIENT
Start: 2017-08-17 | End: 2017-08-17

## 2017-08-17 NOTE — ASU DISCHARGE PLAN (ADULT/PEDIATRIC). - MEDICATION SUMMARY - MEDICATIONS TO TAKE
I will START or STAY ON the medications listed below when I get home from the hospital:    ibuprofen 600 mg oral tablet  -- 1 tab(s) by mouth every 6 hours  -- Do not take this drug if you are pregnant.  It is very important that you take or use this exactly as directed.  Do not skip doses or discontinue unless directed by your doctor.  May cause drowsiness or dizziness.  Obtain medical advice before taking any non-prescription drugs as some may affect the action of this medication.  Take with food or milk.    -- Indication: For moderate pain

## 2017-08-18 ENCOUNTER — TRANSCRIPTION ENCOUNTER (OUTPATIENT)
Age: 37
End: 2017-08-18

## 2018-08-13 ENCOUNTER — OUTPATIENT (OUTPATIENT)
Dept: OUTPATIENT SERVICES | Facility: HOSPITAL | Age: 38
LOS: 1 days | Discharge: HOME | End: 2018-08-13
Payer: MEDICAID

## 2018-08-13 DIAGNOSIS — R76.11 NONSPECIFIC REACTION TO TUBERCULIN SKIN TEST WITHOUT ACTIVE TUBERCULOSIS: ICD-10-CM

## 2018-08-13 DIAGNOSIS — Z98.891 HISTORY OF UTERINE SCAR FROM PREVIOUS SURGERY: Chronic | ICD-10-CM

## 2018-08-13 PROCEDURE — 71046 X-RAY EXAM CHEST 2 VIEWS: CPT | Mod: 26

## 2019-06-04 NOTE — ASU PREOP CHECKLIST - SIDE RAILS UP
[At Term] : at term [Normal Vaginal Route] : by normal vaginal route [[place] [None] : there were no delivery complications done

## 2019-06-06 NOTE — ED PROVIDER NOTE - CARDIAC, MLM
Received office note from Dr. Socorro Hernaneds, pt c/o sob and fatigue, CT chest done at [facility] shows pleural effusions, pt placed on diuretics per Dr. Socorro Hernandes. Called pt in follow up, pt feels much better since starting diuretics.   Still c/o seroma to left thigh EVH
Normal rate, regular rhythm.  Heart sounds S1, S2.  No murmurs, rubs or gallops.

## 2023-01-18 NOTE — ASU PATIENT PROFILE, ADULT - PRO MENTAL HEALTH SX RECENT
I spoke with patient who stated that [employer] was approved.   Patient will bring patient assistance renewal papers to next visit.   none

## 2023-05-03 NOTE — DISCHARGE NOTE OB - DISCHARGE DATE
----- Message from BESSIE Díaz sent at 5/2/2023  1:09 PM EDT -----  Inpatient. Focally active gastritis.   
I left detailed vm (ok per FRANCESCO) of results.  Encouraged to keep f/u appt. tee  
14-Jan-2017

## 2024-05-03 NOTE — BRIEF OPERATIVE NOTE - OPERATION/FINDINGS
EUA: 8wk anteverted uterus, mobile, smooth, small ovaries  Laparoscopy: uterus adhered to anterior abdominal wall on the left side, otherwise normal uterus tubes and ovaries
4 = No assist / stand by assistance

## 2024-07-29 NOTE — CONSULT NOTE ADULT - I WAS PHYSICALLY PRESENT FOR THE KEY PORTIONS OF THE EVALUATION AND MANAGEMENT (E/M) SERVICE PROVIDED.  I AGREE WITH THE ABOVE HISTORY, PHYSICAL, AND PLAN WHICH I HAVE REVIEWED AND EDITED WHERE APPROPRIATE
Pharmacy Name:  [facility]    Pharmacy representative phone number: [phone]     What medication are you calling in regards to: HYDROCHLOROQUINE    What question does the pharmacy have: MAX DOSAGE FOR RHEUMATOID ARTHRITIS  MG, AND THE PRESCRIPTION IS WRITTEN  MG.    Who is the provider that prescribed the medication: DR DURBIN    
Pt is calling because she is waiting for someone to get back with the pharmacy to explain the correct dosage. Pt is out of her medication.   
Statement Selected
Statement Selected

## 2025-01-23 NOTE — DISCHARGE NOTE OB - CARE PROVIDERS DIRECT ADDRESSES
In an effort to ensure that our patients [employer], a Team Member has reviewed your chart and identified an opportunity to provide the best care possible. An attempt was made to discuss or schedule due or overdue Preventive or Chronic Condition care.Care Gaps identified: Colorectal Cancer Screening, Immunizations, and Wellness Visits.    The Outcome was Contact was not made, no answer/busy. We are attempting to schedule a yearly wellness visit. If you have any questions or need help with scheduling, contact your primary care provider..   Type of Appointment needed: Medicare Wellness Visit   
,DirectAddress_Unknown,[email or web address],DirectAddress_Unknown

## 2025-02-04 NOTE — H&P PST ADULT - NS NEC GEN PE MLT EXAM PC
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   [phone]- estela son when its ready for   
No bruits; no thyromegaly or nodules

## 2025-02-10 NOTE — PATIENT PROFILE OB - PRO INTERPRETER NEED 2
Maritza at [facility]'s Prior Authorization department calling.  Unfortunately they are out of network for patients insurance and would need a prior authorization started from Dr Butts's team to try and get this covered.   [place]

## 2025-07-23 NOTE — ED ADULT NURSE NOTE - PSYCHOSOCIAL WDL
0 (no pain/absence of nonverbal indicators of pain) Alert and oriented x 3, normal mood and affect, no apparent risk to self or others.